# Patient Record
Sex: FEMALE | Race: WHITE | NOT HISPANIC OR LATINO | URBAN - METROPOLITAN AREA
[De-identification: names, ages, dates, MRNs, and addresses within clinical notes are randomized per-mention and may not be internally consistent; named-entity substitution may affect disease eponyms.]

---

## 2023-12-15 ENCOUNTER — LAB (OUTPATIENT)
Dept: LAB | Facility: LAB | Age: 28
End: 2023-12-15
Payer: COMMERCIAL

## 2023-12-15 DIAGNOSIS — O20.0: ICD-10-CM

## 2023-12-15 DIAGNOSIS — O20.0: Primary | ICD-10-CM

## 2023-12-15 LAB — HCG SERPL-ACNC: 1517 MIU/ML

## 2023-12-15 PROCEDURE — 84702 CHORIONIC GONADOTROPIN TEST: CPT

## 2023-12-15 PROCEDURE — 36415 COLL VENOUS BLD VENIPUNCTURE: CPT

## 2023-12-18 ENCOUNTER — LAB (OUTPATIENT)
Dept: LAB | Facility: LAB | Age: 28
End: 2023-12-18
Payer: COMMERCIAL

## 2023-12-18 DIAGNOSIS — O20.0: ICD-10-CM

## 2023-12-18 LAB — HCG SERPL-ACNC: 3527 MIU/ML

## 2023-12-18 PROCEDURE — 36415 COLL VENOUS BLD VENIPUNCTURE: CPT

## 2023-12-18 PROCEDURE — 84702 CHORIONIC GONADOTROPIN TEST: CPT

## 2023-12-28 DIAGNOSIS — O20.0 THREATENED ABORTION (HHS-HCC): Primary | ICD-10-CM

## 2024-01-05 ENCOUNTER — APPOINTMENT (OUTPATIENT)
Dept: OBSTETRICS AND GYNECOLOGY | Facility: CLINIC | Age: 29
End: 2024-01-05
Payer: COMMERCIAL

## 2024-03-26 PROCEDURE — 87800 DETECT AGNT MULT DNA DIREC: CPT

## 2024-03-26 PROCEDURE — 88175 CYTOPATH C/V AUTO FLUID REDO: CPT

## 2024-03-27 ENCOUNTER — LAB REQUISITION (OUTPATIENT)
Dept: LAB | Facility: HOSPITAL | Age: 29
End: 2024-03-27
Payer: COMMERCIAL

## 2024-03-27 DIAGNOSIS — Z34.81 ENCOUNTER FOR SUPERVISION OF OTHER NORMAL PREGNANCY, FIRST TRIMESTER (HHS-HCC): ICD-10-CM

## 2024-03-28 ENCOUNTER — LAB (OUTPATIENT)
Dept: LAB | Facility: LAB | Age: 29
End: 2024-03-28
Payer: COMMERCIAL

## 2024-03-28 DIAGNOSIS — Z87.59 PERSONAL HISTORY OF OTHER COMPLICATIONS OF PREGNANCY, CHILDBIRTH AND THE PUERPERIUM: ICD-10-CM

## 2024-03-28 DIAGNOSIS — Z34.81 ENCOUNTER FOR SUPERVISION OF OTHER NORMAL PREGNANCY, FIRST TRIMESTER (HHS-HCC): Primary | ICD-10-CM

## 2024-03-28 LAB
ABO GROUP (TYPE) IN BLOOD: NORMAL
ALT SERPL-CCNC: 9 U/L (ref 5–40)
ANTIBODY SCREEN: NORMAL
AST SERPL-CCNC: 12 U/L (ref 5–40)
BUN SERPL-MCNC: 6 MG/DL (ref 8–25)
C TRACH RRNA SPEC QL NAA+PROBE: NEGATIVE
CREAT SERPL-MCNC: 0.6 MG/DL (ref 0.4–1.6)
CREAT UR-MCNC: 236.8 MG/DL
EGFRCR SERPLBLD CKD-EPI 2021: >90 ML/MIN/1.73M*2
ERYTHROCYTE [DISTWIDTH] IN BLOOD BY AUTOMATED COUNT: 13.8 % (ref 11.5–14.5)
EST. AVERAGE GLUCOSE BLD GHB EST-MCNC: 88 MG/DL
HBA1C MFR BLD: 4.7 %
HBV SURFACE AG SERPL QL IA: NONREACTIVE
HCT VFR BLD AUTO: 39.5 % (ref 36–46)
HCV AB SER QL: NONREACTIVE
HGB BLD-MCNC: 13.2 G/DL (ref 12–16)
HIV 1+2 AB+HIV1 P24 AG SERPL QL IA: NONREACTIVE
LDH SERPL-CCNC: 154 U/L (ref 91–227)
MCH RBC QN AUTO: 29.4 PG (ref 26–34)
MCHC RBC AUTO-ENTMCNC: 33.4 G/DL (ref 32–36)
MCV RBC AUTO: 88 FL (ref 80–100)
N GONORRHOEA DNA SPEC QL PROBE+SIG AMP: NEGATIVE
NRBC BLD-RTO: 0 /100 WBCS (ref 0–0)
PLATELET # BLD AUTO: 201 X10*3/UL (ref 150–450)
PROT UR-MCNC: 23 MG/DL
PROT/CREAT UR: 0.1 MG/MG CREAT
RBC # BLD AUTO: 4.49 X10*6/UL (ref 4–5.2)
RH FACTOR (ANTIGEN D): NORMAL
RUBV IGG SERPL IA-ACNC: 1 IA
RUBV IGG SERPL QL IA: POSITIVE
TREPONEMA PALLIDUM IGG+IGM AB [PRESENCE] IN SERUM OR PLASMA BY IMMUNOASSAY: NONREACTIVE
URATE SERPL-MCNC: 5.8 MG/DL (ref 2.5–6.8)
WBC # BLD AUTO: 9.9 X10*3/UL (ref 4.4–11.3)

## 2024-03-28 PROCEDURE — 87389 HIV-1 AG W/HIV-1&-2 AB AG IA: CPT

## 2024-03-28 PROCEDURE — 83615 LACTATE (LD) (LDH) ENZYME: CPT

## 2024-03-28 PROCEDURE — 82570 ASSAY OF URINE CREATININE: CPT

## 2024-03-28 PROCEDURE — 86780 TREPONEMA PALLIDUM: CPT

## 2024-03-28 PROCEDURE — 86850 RBC ANTIBODY SCREEN: CPT

## 2024-03-28 PROCEDURE — 86317 IMMUNOASSAY INFECTIOUS AGENT: CPT

## 2024-03-28 PROCEDURE — 86901 BLOOD TYPING SEROLOGIC RH(D): CPT

## 2024-03-28 PROCEDURE — 87340 HEPATITIS B SURFACE AG IA: CPT

## 2024-03-28 PROCEDURE — 84460 ALANINE AMINO (ALT) (SGPT): CPT

## 2024-03-28 PROCEDURE — 83036 HEMOGLOBIN GLYCOSYLATED A1C: CPT

## 2024-03-28 PROCEDURE — 86803 HEPATITIS C AB TEST: CPT

## 2024-03-28 PROCEDURE — 36415 COLL VENOUS BLD VENIPUNCTURE: CPT

## 2024-03-28 PROCEDURE — 86900 BLOOD TYPING SEROLOGIC ABO: CPT

## 2024-03-28 PROCEDURE — 85027 COMPLETE CBC AUTOMATED: CPT

## 2024-03-28 PROCEDURE — 84450 TRANSFERASE (AST) (SGOT): CPT

## 2024-03-28 PROCEDURE — 84520 ASSAY OF UREA NITROGEN: CPT

## 2024-03-28 PROCEDURE — 84550 ASSAY OF BLOOD/URIC ACID: CPT

## 2024-03-28 PROCEDURE — 87086 URINE CULTURE/COLONY COUNT: CPT

## 2024-03-28 PROCEDURE — 82565 ASSAY OF CREATININE: CPT

## 2024-03-28 PROCEDURE — 84156 ASSAY OF PROTEIN URINE: CPT

## 2024-03-30 LAB — BACTERIA UR CULT: NORMAL

## 2024-04-03 LAB
CYTOLOGY CMNT CVX/VAG CYTO-IMP: NORMAL
LAB AP HPV GENOTYPE QUESTION: NO
LAB AP HPV HR: NORMAL
LAB AP PAP ADDITIONAL TESTS: NORMAL
LABORATORY COMMENT REPORT: NORMAL
LMP START DATE: NORMAL
MENSTRUAL HX REPORTED: NORMAL
PATH REPORT.TOTAL CANCER: NORMAL

## 2024-05-02 ENCOUNTER — LAB (OUTPATIENT)
Dept: LAB | Facility: LAB | Age: 29
End: 2024-05-02
Payer: COMMERCIAL

## 2024-05-02 DIAGNOSIS — Z34.82 ENCOUNTER FOR SUPERVISION OF OTHER NORMAL PREGNANCY, SECOND TRIMESTER (HHS-HCC): Primary | ICD-10-CM

## 2024-05-02 LAB
ERYTHROCYTE [DISTWIDTH] IN BLOOD BY AUTOMATED COUNT: 13.7 % (ref 11.5–14.5)
GLUCOSE 1H P GLC SERPL-MCNC: 86 MG/DL (ref 65–139)
HCT VFR BLD AUTO: 40.8 % (ref 36–46)
HGB BLD-MCNC: 13.4 G/DL (ref 12–16)
MCH RBC QN AUTO: 29.5 PG (ref 26–34)
MCHC RBC AUTO-ENTMCNC: 32.8 G/DL (ref 32–36)
MCV RBC AUTO: 90 FL (ref 80–100)
NRBC BLD-RTO: 0 /100 WBCS (ref 0–0)
PLATELET # BLD AUTO: 218 X10*3/UL (ref 150–450)
RBC # BLD AUTO: 4.54 X10*6/UL (ref 4–5.2)
WBC # BLD AUTO: 10.9 X10*3/UL (ref 4.4–11.3)

## 2024-05-02 PROCEDURE — 82947 ASSAY GLUCOSE BLOOD QUANT: CPT

## 2024-05-02 PROCEDURE — 36415 COLL VENOUS BLD VENIPUNCTURE: CPT

## 2024-05-02 PROCEDURE — 85027 COMPLETE CBC AUTOMATED: CPT

## 2024-05-15 PROBLEM — Z87.59 HISTORY OF GESTATIONAL HYPERTENSION: Status: ACTIVE | Noted: 2024-05-15

## 2024-05-15 PROBLEM — F43.10 PTSD (POST-TRAUMATIC STRESS DISORDER): Status: ACTIVE | Noted: 2024-05-15

## 2024-05-15 PROBLEM — Z87.59 HISTORY OF POSTPARTUM DEPRESSION: Status: ACTIVE | Noted: 2024-05-15

## 2024-05-15 PROBLEM — O99.210 OBESITY AFFECTING PREGNANCY (HHS-HCC): Status: ACTIVE | Noted: 2024-05-15

## 2024-05-15 PROBLEM — Z30.09 BIRTH CONTROL COUNSELING: Status: ACTIVE | Noted: 2024-05-15

## 2024-05-15 PROBLEM — Z86.59 HISTORY OF POSTPARTUM DEPRESSION: Status: ACTIVE | Noted: 2024-05-15

## 2024-05-15 PROBLEM — F31.9 BIPOLAR 1 DISORDER (MULTI): Status: ACTIVE | Noted: 2024-05-15

## 2024-05-15 NOTE — ASSESSMENT & PLAN NOTE
-Patient with prior hx of gHTN in 2 prior pregnancies, no c/f PEC w/ SF requiring Mg gtt intrapartum or postpartum  -Given prior history of gHTN, discussed with patient increased likelihood of developing gHTN in subsequent pregnancies.   -we discussed risks associated with gHTN in pregnancy including PEC, IUGR, IUFD, oligohydramnios, abruption. Patient expressed understanding of the above   -[facility] HELLP labs wnl  -  testing: would recommend growth US at 32/36 wks with twice weekly BPPS or NSTs with AFIs

## 2024-05-15 NOTE — ASSESSMENT & PLAN NOTE
-Watched her father take his own life at age 18  -Discussed any healthcare related or personal triggers at today's appointment, currently denies  -Encouraged to reach out if additional support needed during this pregnancy

## 2024-05-15 NOTE — ASSESSMENT & PLAN NOTE
-Patient not currently on medication per chart review  -Patient feeling really well today, denies/endorses SI/HI/AVH  -Continue to check in and monitor throughout pregnancy  -Declines starting medication today, encouraged to call in if any concerns.

## 2024-05-15 NOTE — ASSESSMENT & PLAN NOTE
-we discussed that obesity during pregnancy has both maternal and fetal sequelae. Obese women with gestational diabetes have a two-fold increased prevalence of developing Type 2 diabetes later in life compared to patients without gestational diabetes and a normal BMI.  In addition, there is an increased risk of developing gestational hypertension, a 6% chance of preeclampsia, increased risk of an operative vaginal delivery, and an increased risk of shoulder dystocia and  section.  In the post-partum period, obesity is associated with both wound dehiscence and wound infection should the patient need a  delivery but has a history of prior uncomplicated term vaginal deliveries.    -Fetal complications related to maternal obesity include a 15% chance of macrosomia, an increased risk for fetal anomalies, most commonly neural tube and cardiovascular defects, and an increase risk of stillbirth.  Due to maternal habitus it is also less likely that a congenital anomaly will be visualized prenatally by ultrasound.    -As Ms. Valenzuela's pre-pregnancy BMI was 43 kg/m2, the [place] [employer] recommends no more than an 11-20 pound weight gain during pregnancy however patient has gained >70lb. Would encourage to maintain steady weight at this time  -Nutrition consult, weekly NST startingat 34 weeks, and growth at 30 and 36 weeks though will bring her back in 3 weeks due to borderline biometry today. Will plan to call patient outpatient  -At this time, patient will plan to delivery at [facility] due to BMI and increased risks of complication if delivery necessitating  section. Given higher level care center, this would be appropriate. However, can continue routine PNC with general OB provider who will schedule patient for delivery at [facility] as indicated.  -Labs reviewed, including A1C earlier in pregnancy without any acute concern for T2DM

## 2024-05-16 ENCOUNTER — INITIAL PRENATAL (OUTPATIENT)
Dept: MATERNAL FETAL MEDICINE | Facility: CLINIC | Age: 29
End: 2024-05-16
Payer: COMMERCIAL

## 2024-05-16 ENCOUNTER — HOSPITAL ENCOUNTER (OUTPATIENT)
Dept: RADIOLOGY | Facility: CLINIC | Age: 29
Discharge: HOME | End: 2024-05-16
Payer: COMMERCIAL

## 2024-05-16 VITALS
WEIGHT: 293 LBS | SYSTOLIC BLOOD PRESSURE: 130 MMHG | DIASTOLIC BLOOD PRESSURE: 78 MMHG | HEIGHT: 63 IN | BODY MASS INDEX: 51.91 KG/M2

## 2024-05-16 DIAGNOSIS — Z30.09 BIRTH CONTROL COUNSELING: ICD-10-CM

## 2024-05-16 DIAGNOSIS — E66.01 SEVERE OBESITY DUE TO EXCESS CALORIES AFFECTING PREGNANCY IN SECOND TRIMESTER (MULTI): Primary | ICD-10-CM

## 2024-05-16 DIAGNOSIS — F43.10 PTSD (POST-TRAUMATIC STRESS DISORDER): ICD-10-CM

## 2024-05-16 DIAGNOSIS — O99.210 OBESITY AFFECTING PREGNANCY (HHS-HCC): ICD-10-CM

## 2024-05-16 DIAGNOSIS — Z87.59 HISTORY OF GESTATIONAL HYPERTENSION: ICD-10-CM

## 2024-05-16 DIAGNOSIS — Z3A.27 27 WEEKS GESTATION OF PREGNANCY (HHS-HCC): ICD-10-CM

## 2024-05-16 DIAGNOSIS — Z87.59 HISTORY OF POSTPARTUM DEPRESSION: ICD-10-CM

## 2024-05-16 DIAGNOSIS — Z86.59 HISTORY OF POSTPARTUM DEPRESSION: ICD-10-CM

## 2024-05-16 DIAGNOSIS — Z36.89 SCREENING, ANTENATAL, FOR FETAL ANATOMIC SURVEY (HHS-HCC): ICD-10-CM

## 2024-05-16 DIAGNOSIS — O99.212 SEVERE OBESITY DUE TO EXCESS CALORIES AFFECTING PREGNANCY IN SECOND TRIMESTER (MULTI): Primary | ICD-10-CM

## 2024-05-16 PROBLEM — O99.342 BIPOLAR DISEASE DURING PREGNANCY IN SECOND TRIMESTER (MULTI): Status: ACTIVE | Noted: 2024-05-15

## 2024-05-16 PROCEDURE — 76811 OB US DETAILED SNGL FETUS: CPT

## 2024-05-16 PROCEDURE — 99214 OFFICE O/P EST MOD 30 MIN: CPT | Mod: GC,25 | Performed by: OBSTETRICS & GYNECOLOGY

## 2024-05-16 PROCEDURE — 76819 FETAL BIOPHYS PROFIL W/O NST: CPT | Performed by: OBSTETRICS & GYNECOLOGY

## 2024-05-16 PROCEDURE — 76819 FETAL BIOPHYS PROFIL W/O NST: CPT

## 2024-05-16 PROCEDURE — 99204 OFFICE O/P NEW MOD 45 MIN: CPT | Performed by: OBSTETRICS & GYNECOLOGY

## 2024-05-16 PROCEDURE — 76811 OB US DETAILED SNGL FETUS: CPT | Performed by: OBSTETRICS & GYNECOLOGY

## 2024-05-16 NOTE — PROGRESS NOTES
2024   Elma Valenzuela     MFM CONSULT NOTE  Referring Clinician: Dr. Worrell  Reason for consult: Obesity in Pregnancy, History of gHTN in prior pregnancy    HPI: Elma Valenzuela is a 29 y.o.  at 27w4d here for consult for Obesity in Pregnancy and history of gestational HTN. Doing okay at today's visit. Denies LOF, VB and reporting good FM. Has otherwise had a pretty uncomplicated pregnancy at this point and denies any concerns. Regarding her weight gain in pregnancy, patient states that she had previously lost weight in pregnancy but states that she has had an increased weight trend this pregnancy of >70lbs. Denies any dietary changes and notes an otherwise sedentary lifestyle.     Patient reports.     Other pregnancy complications include   Patient Active Problem List   Diagnosis    Obesity affecting pregnancy ([facility])    History of gestational hypertension    PTSD (post-traumatic stress disorder)    Bipolar disease during pregnancy in second trimester (Multi)    History of postpartum depression    Birth control counseling    27 weeks gestation of pregnancy ([facility])   Denies contractions, bleeding, or LOF. Reports normal fetal movement    10 point review of system is negative except as above    OB History          5    Para   4    Term   3       1    AB   0    Living   4         SAB        IAB        Ectopic        Multiple        Live Births   3                   Past medical history: Denies HTN, DM, asthma, depression, or thyroid issues    Past Surgical History:   Procedure Laterality Date    OTHER SURGICAL HISTORY  2014    Dental Surgery       Medications:   Medication Documentation Review Audit       Reviewed by Michele Li MD (Resident) on 24 at 1210      Medication Order Taking? Sig Documenting Provider Last Dose Status   BABY ASPIRIN ORAL [id number] Yes Take by mouth. Historical Provider, MD  Active   prenatal no115/iron/folic acid (PRENATAL 19 ORAL) [id number]  "Yes Take by mouth. Historical Provider, MD  Active                     Allergies   Allergen Reactions    Amoxicillin Shortness of breath    Shrimp Anaphylaxis       Social History     Tobacco Use    Smoking status: Never    Smokeless tobacco: Never   Substance Use Topics    Alcohol use: Not Currently    Drug use: Never       family history includes Bipolar disorder in her mother; Breast cancer in her paternal grandmother; Colon cancer in her paternal grandfather; Depression in her sister; Hypertension in her father; Uterine cancer in an other family member.      OBJECTIVE  Visit Vitals  /78 Comment: HR 75   Ht 1.6 m (5' 3\")   Wt 140 kg (308 lb 9.6 oz)   BMI 54.67 kg/m²   OB Status Pregnant   Smoking Status Never   BSA 2.49 m²       Physical exam    FHT: US    ASSESSMENT & PLAN    Elma Valenzuela is a 29 y.o.  at 27w4d here for the following:    Obesity affecting pregnancy ([facility])  -we discussed that obesity during pregnancy has both maternal and fetal sequelae. Obese women with gestational diabetes have a two-fold increased prevalence of developing Type 2 diabetes later in life compared to patients without gestational diabetes and a normal BMI.  In addition, there is an increased risk of developing gestational hypertension, a 6% chance of preeclampsia, increased risk of an operative vaginal delivery, and an increased risk of shoulder dystocia and  section.  In the post-partum period, obesity is associated with both wound dehiscence and wound infection should the patient need a  delivery but has a history of prior uncomplicated term vaginal deliveries.    -Fetal complications related to maternal obesity include a 15% chance of macrosomia, an increased risk for fetal anomalies, most commonly neural tube and cardiovascular defects, and an increase risk of stillbirth.  Due to maternal habitus it is also less likely that a congenital anomaly will be visualized prenatally by ultrasound.  "   -As Ms. Valenzuela's current BMI is 43 kg/m2, the [place] of [employer] recommends no more than an 11-20 pound weight gain during pregnancy. Would encourage to maintain steady weight at this time  -Nutrition consult, twice weekly NST, and q3week US. Will plan to call patient outpatient  -At this time, patient will plan to delivery at [facility] due to BMI and increased risks of complication if delivery necessitating  section. Given higher level care center, this would be appropriate. However, can continue routine PNC with general OB provider who will schedule patient for delivery at [facility] as indicated.  -Labs reviewed, including A1C earlier in pregnancy without any acute concern for T2DM  -1h GTT as indicated         History of gestational hypertension  -Patient with prior hx of gHTN in 2 prior pregnancies, no c/f PEC w/ SF requiring Mg gtt intrapartum or postpartum  -Given prior history of gHTN, discussed with patient increased likelihood of developing gHTN in subsequent pregnancies.   -we discussed risks associated with gHTN in pregnancy including PEC, IUGR, IUFD, oligohydramnios, abruption. Patient expressed understanding of the above   -[facility] HELLP labs wnl  -  testing: would recommend growth US at 32/36 wks with twice weekly BPPS or NSTs with AFIs       PTSD (post-traumatic stress disorder)  -Watched her father take his own life at age 18  -Discussed any healthcare related or personal triggers at today's appointment, currently denies  -Encouraged to reach out if additional support needed during this pregnancy    History of postpartum depression  -Patient not currently on medication per chart review  -Patient feeling really well today, denies/endorses SI/HI/AVH  -Continue to check in and monitor throughout pregnancy  -Declines starting medication today, encouraged to call in if any concerns.    Birth control counseling  -Patient desire BTL, consents signed in office today  5/16    27 weeks gestation of pregnancy ([facility])  -PNLs reviewed, UTD  -Continue routine PNC w/ primary OB provider Dr. Worrell at [facility]     Thank you for allowing us to participate in the care of your patient. We anticipate she should be able to continue her care under your supervision. Please do not hesitate to contact us should any concerns arise.    D/w Dr. Salgado--M Attending    Michele Li MD   Maternal Fetal Medicine

## 2024-05-16 NOTE — ASSESSMENT & PLAN NOTE
-PNLs reviewed, UTD  -Continue routine PNC w/ primary OB provider Dr. Worrell at [facility]

## 2024-05-31 ENCOUNTER — LAB (OUTPATIENT)
Dept: LAB | Facility: LAB | Age: 29
End: 2024-05-31
Payer: COMMERCIAL

## 2024-05-31 DIAGNOSIS — R03.0 ELEVATED BLOOD-PRESSURE READING, WITHOUT DIAGNOSIS OF HYPERTENSION: Primary | ICD-10-CM

## 2024-05-31 LAB
ALT SERPL-CCNC: 12 U/L (ref 5–40)
AST SERPL-CCNC: 12 U/L (ref 5–40)
BUN SERPL-MCNC: 6 MG/DL (ref 8–25)
CREAT SERPL-MCNC: 0.6 MG/DL (ref 0.4–1.6)
CREAT UR-MCNC: 192.1 MG/DL
EGFRCR SERPLBLD CKD-EPI 2021: >90 ML/MIN/1.73M*2
ERYTHROCYTE [DISTWIDTH] IN BLOOD BY AUTOMATED COUNT: 13.8 % (ref 11.5–14.5)
HCT VFR BLD AUTO: 39.7 % (ref 36–46)
HGB BLD-MCNC: 13 G/DL (ref 12–16)
MCH RBC QN AUTO: 28.7 PG (ref 26–34)
MCHC RBC AUTO-ENTMCNC: 32.7 G/DL (ref 32–36)
MCV RBC AUTO: 88 FL (ref 80–100)
NRBC BLD-RTO: 0 /100 WBCS (ref 0–0)
PLATELET # BLD AUTO: 213 X10*3/UL (ref 150–450)
PROT UR-MCNC: 18 MG/DL
PROT/CREAT UR: 0.09 MG/MG CREAT
RBC # BLD AUTO: 4.53 X10*6/UL (ref 4–5.2)
URATE SERPL-MCNC: 5.3 MG/DL (ref 2.5–6.8)
WBC # BLD AUTO: 12 X10*3/UL (ref 4.4–11.3)

## 2024-05-31 PROCEDURE — 84460 ALANINE AMINO (ALT) (SGPT): CPT

## 2024-05-31 PROCEDURE — 82565 ASSAY OF CREATININE: CPT

## 2024-05-31 PROCEDURE — 36415 COLL VENOUS BLD VENIPUNCTURE: CPT

## 2024-05-31 PROCEDURE — 85027 COMPLETE CBC AUTOMATED: CPT

## 2024-05-31 PROCEDURE — 84156 ASSAY OF PROTEIN URINE: CPT

## 2024-05-31 PROCEDURE — 84520 ASSAY OF UREA NITROGEN: CPT

## 2024-05-31 PROCEDURE — 84550 ASSAY OF BLOOD/URIC ACID: CPT

## 2024-05-31 PROCEDURE — 82570 ASSAY OF URINE CREATININE: CPT

## 2024-05-31 PROCEDURE — 84450 TRANSFERASE (AST) (SGOT): CPT

## 2024-06-06 ENCOUNTER — HOSPITAL ENCOUNTER (OUTPATIENT)
Dept: RADIOLOGY | Facility: CLINIC | Age: 29
Discharge: HOME | End: 2024-06-06
Payer: COMMERCIAL

## 2024-06-06 DIAGNOSIS — Z36.89 SCREENING, ANTENATAL, FOR FETAL ANATOMIC SURVEY (HHS-HCC): ICD-10-CM

## 2024-06-06 PROCEDURE — 76816 OB US FOLLOW-UP PER FETUS: CPT | Performed by: OBSTETRICS & GYNECOLOGY

## 2024-06-06 PROCEDURE — 76816 OB US FOLLOW-UP PER FETUS: CPT

## 2024-06-10 ENCOUNTER — HOSPITAL ENCOUNTER (EMERGENCY)
Facility: HOSPITAL | Age: 29
Discharge: HOME | End: 2024-06-11
Attending: EMERGENCY MEDICINE
Payer: COMMERCIAL

## 2024-06-10 DIAGNOSIS — K04.7 DENTAL ABSCESS: Primary | ICD-10-CM

## 2024-06-10 DIAGNOSIS — R03.0 ELEVATED BLOOD PRESSURE READING: ICD-10-CM

## 2024-06-10 PROCEDURE — 99283 EMERGENCY DEPT VISIT LOW MDM: CPT

## 2024-06-10 PROCEDURE — 2500000001 HC RX 250 WO HCPCS SELF ADMINISTERED DRUGS (ALT 637 FOR MEDICARE OP): Performed by: EMERGENCY MEDICINE

## 2024-06-10 RX ADMIN — TOPICAL ANESTHETIC 1 SPRAY: 200 SPRAY DENTAL; PERIODONTAL at 23:25

## 2024-06-10 ASSESSMENT — COLUMBIA-SUICIDE SEVERITY RATING SCALE - C-SSRS
1. IN THE PAST MONTH, HAVE YOU WISHED YOU WERE DEAD OR WISHED YOU COULD GO TO SLEEP AND NOT WAKE UP?: NO
6. HAVE YOU EVER DONE ANYTHING, STARTED TO DO ANYTHING, OR PREPARED TO DO ANYTHING TO END YOUR LIFE?: NO
2. HAVE YOU ACTUALLY HAD ANY THOUGHTS OF KILLING YOURSELF?: NO

## 2024-06-10 ASSESSMENT — PAIN - FUNCTIONAL ASSESSMENT: PAIN_FUNCTIONAL_ASSESSMENT: 0-10

## 2024-06-10 ASSESSMENT — PAIN SCALES - GENERAL: PAINLEVEL_OUTOF10: 0 - NO PAIN

## 2024-06-11 ENCOUNTER — HOSPITAL ENCOUNTER (OUTPATIENT)
Facility: HOSPITAL | Age: 29
Discharge: HOME | End: 2024-06-11
Attending: OBSTETRICS & GYNECOLOGY | Admitting: OBSTETRICS & GYNECOLOGY
Payer: COMMERCIAL

## 2024-06-11 VITALS
WEIGHT: 293 LBS | BODY MASS INDEX: 51.91 KG/M2 | HEIGHT: 63 IN | SYSTOLIC BLOOD PRESSURE: 109 MMHG | TEMPERATURE: 98.2 F | RESPIRATION RATE: 18 BRPM | DIASTOLIC BLOOD PRESSURE: 65 MMHG | HEART RATE: 83 BPM | OXYGEN SATURATION: 98 %

## 2024-06-11 VITALS
DIASTOLIC BLOOD PRESSURE: 91 MMHG | WEIGHT: 293 LBS | RESPIRATION RATE: 15 BRPM | HEIGHT: 63 IN | SYSTOLIC BLOOD PRESSURE: 137 MMHG | OXYGEN SATURATION: 100 % | HEART RATE: 91 BPM | BODY MASS INDEX: 51.91 KG/M2 | TEMPERATURE: 97.9 F

## 2024-06-11 LAB
ALBUMIN SERPL-MCNC: 3.1 G/DL (ref 3.5–5)
ALP BLD-CCNC: 81 U/L (ref 35–125)
ALT SERPL-CCNC: 10 U/L (ref 5–40)
ANION GAP SERPL CALC-SCNC: 13 MMOL/L
AST SERPL-CCNC: 13 U/L (ref 5–40)
BILIRUB SERPL-MCNC: 0.2 MG/DL (ref 0.1–1.2)
BUN SERPL-MCNC: 5 MG/DL (ref 8–25)
CALCIUM SERPL-MCNC: 8.4 MG/DL (ref 8.5–10.4)
CHLORIDE SERPL-SCNC: 105 MMOL/L (ref 97–107)
CO2 SERPL-SCNC: 19 MMOL/L (ref 24–31)
CREAT SERPL-MCNC: 0.5 MG/DL (ref 0.4–1.6)
CREAT UR-MCNC: 105.5 MG/DL
EGFRCR SERPLBLD CKD-EPI 2021: >90 ML/MIN/1.73M*2
ERYTHROCYTE [DISTWIDTH] IN BLOOD BY AUTOMATED COUNT: 13.7 % (ref 11.5–14.5)
GLUCOSE SERPL-MCNC: 77 MG/DL (ref 65–99)
HCT VFR BLD AUTO: 36 % (ref 36–46)
HGB BLD-MCNC: 12.2 G/DL (ref 12–16)
LDH SERPL-CCNC: 166 U/L (ref 91–227)
MCH RBC QN AUTO: 28.8 PG (ref 26–34)
MCHC RBC AUTO-ENTMCNC: 33.9 G/DL (ref 32–36)
MCV RBC AUTO: 85 FL (ref 80–100)
NRBC BLD-RTO: 0 /100 WBCS (ref 0–0)
PLATELET # BLD AUTO: 185 X10*3/UL (ref 150–450)
POTASSIUM SERPL-SCNC: 3.4 MMOL/L (ref 3.4–5.1)
PROT SERPL-MCNC: 6.4 G/DL (ref 5.9–7.9)
PROT UR-MCNC: 11 MG/DL
PROT/CREAT UR: 0.1 MG/MG CREAT
RBC # BLD AUTO: 4.24 X10*6/UL (ref 4–5.2)
SODIUM SERPL-SCNC: 137 MMOL/L (ref 133–145)
URATE SERPL-MCNC: 5 MG/DL (ref 2.5–6.8)
WBC # BLD AUTO: 10.8 X10*3/UL (ref 4.4–11.3)

## 2024-06-11 PROCEDURE — 80053 COMPREHEN METABOLIC PANEL: CPT | Performed by: OBSTETRICS & GYNECOLOGY

## 2024-06-11 PROCEDURE — 99214 OFFICE O/P EST MOD 30 MIN: CPT

## 2024-06-11 PROCEDURE — 82570 ASSAY OF URINE CREATININE: CPT | Performed by: OBSTETRICS & GYNECOLOGY

## 2024-06-11 PROCEDURE — 85027 COMPLETE CBC AUTOMATED: CPT | Performed by: OBSTETRICS & GYNECOLOGY

## 2024-06-11 PROCEDURE — 2500000001 HC RX 250 WO HCPCS SELF ADMINISTERED DRUGS (ALT 637 FOR MEDICARE OP): Performed by: EMERGENCY MEDICINE

## 2024-06-11 PROCEDURE — 83615 LACTATE (LD) (LDH) ENZYME: CPT | Performed by: OBSTETRICS & GYNECOLOGY

## 2024-06-11 PROCEDURE — 36415 COLL VENOUS BLD VENIPUNCTURE: CPT | Performed by: OBSTETRICS & GYNECOLOGY

## 2024-06-11 PROCEDURE — 84550 ASSAY OF BLOOD/URIC ACID: CPT | Performed by: OBSTETRICS & GYNECOLOGY

## 2024-06-11 RX ORDER — ONDANSETRON HYDROCHLORIDE 2 MG/ML
4 INJECTION, SOLUTION INTRAVENOUS EVERY 6 HOURS PRN
Status: DISCONTINUED | OUTPATIENT
Start: 2024-06-11 | End: 2024-06-11 | Stop reason: HOSPADM

## 2024-06-11 RX ORDER — NIFEDIPINE 10 MG/1
10 CAPSULE ORAL ONCE AS NEEDED
Status: DISCONTINUED | OUTPATIENT
Start: 2024-06-11 | End: 2024-06-11 | Stop reason: HOSPADM

## 2024-06-11 RX ORDER — LIDOCAINE HYDROCHLORIDE 10 MG/ML
0.5 INJECTION INFILTRATION; PERINEURAL ONCE AS NEEDED
Status: DISCONTINUED | OUTPATIENT
Start: 2024-06-11 | End: 2024-06-11 | Stop reason: HOSPADM

## 2024-06-11 RX ORDER — CEPHALEXIN 500 MG/1
500 CAPSULE ORAL ONCE
Status: COMPLETED | OUTPATIENT
Start: 2024-06-11 | End: 2024-06-11

## 2024-06-11 RX ORDER — HYDRALAZINE HYDROCHLORIDE 20 MG/ML
5 INJECTION INTRAMUSCULAR; INTRAVENOUS ONCE AS NEEDED
Status: DISCONTINUED | OUTPATIENT
Start: 2024-06-11 | End: 2024-06-11 | Stop reason: HOSPADM

## 2024-06-11 RX ORDER — CEPHALEXIN 500 MG/1
500 CAPSULE ORAL 3 TIMES DAILY
Qty: 21 CAPSULE | Refills: 0 | Status: SHIPPED | OUTPATIENT
Start: 2024-06-11 | End: 2024-06-18

## 2024-06-11 RX ORDER — ONDANSETRON 4 MG/1
4 TABLET, FILM COATED ORAL EVERY 6 HOURS PRN
Status: DISCONTINUED | OUTPATIENT
Start: 2024-06-11 | End: 2024-06-11 | Stop reason: HOSPADM

## 2024-06-11 RX ORDER — LABETALOL HYDROCHLORIDE 5 MG/ML
20 INJECTION, SOLUTION INTRAVENOUS ONCE AS NEEDED
Status: DISCONTINUED | OUTPATIENT
Start: 2024-06-11 | End: 2024-06-11 | Stop reason: HOSPADM

## 2024-06-11 RX ADMIN — CEPHALEXIN 500 MG: 500 CAPSULE ORAL at 00:43

## 2024-06-11 ASSESSMENT — PAIN SCALES - GENERAL
PAINLEVEL: 5 - MODERATE PAIN
PAINLEVEL_OUTOF10: 5 - MODERATE PAIN

## 2024-06-11 NOTE — H&P
Obstetrical Admission History and Physical    28 yo  history of past GHTN, with mild range BP at last visit for the first time this pregnancy.     Pt also currently with known dental abscess that was drained in the ED last night. Started on Keflex, but she hasn't filled the prescription yet. Today has had a headache in addition to her abscess pain. Has been taking regular tylenol, but headache as been persisting.    No vision changes, no nausea/vomiting.    Monitoring home BP and she states it was 144/93 at home.       A/P 28 yo  @ 31w2d, current dental abscess, elevated BP on home monitoring    Pt normotensive here, continue to monitor, check HELLP labs    Pain could be secondary to dental abscess. Offered patient stronger pain meds while being observed in triage but she declines at this time.     Category 1 FHR     Obstetrical History   OB History    Para Term  AB Living   5 4 3 1 0 4   SAB IAB Ectopic Multiple Live Births           3      # Outcome Date GA Lbr Ascencion/2nd Weight Sex Delivery Anes PTL Lv   5 Current            4   35w0d  2.722 kg M Vag-Spont EPI  CHIOMA   3 Term  39w0d   M Vag-Spont EPI  CHIOMA   2 Term    2.835 kg M Vag-Spont EPI N    1 Term    2.892 kg F Vag-Spont EPI N CHIOMA       Past Medical History  Past Medical History:   Diagnosis Date    Anxiety disorder, unspecified     Anxiety    Bipolar 1 disorder (Multi)     Personal history of other infectious and parasitic diseases     History of varicella    Personal history of other mental and behavioral disorders     History of depression    PTSD (post-traumatic stress disorder)         Past Surgical History   Past Surgical History:   Procedure Laterality Date    OTHER SURGICAL HISTORY  2014    Dental Surgery       Social History  Social History     Tobacco Use    Smoking status: Never    Smokeless tobacco: Never   Substance Use Topics    Alcohol use: Not Currently     Substance and Sexual Activity   Drug Use Never        Allergies  Amoxicillin and Shrimp     Medications  Medications Prior to Admission   Medication Sig Dispense Refill Last Dose    BABY ASPIRIN ORAL Take by mouth.       cephalexin (Keflex) 500 mg capsule Take 1 capsule (500 mg) by mouth 3 times a day for 7 days. 21 capsule 0     prenatal no115/iron/folic acid (PRENATAL 19 ORAL) Take by mouth.          Objective    Last Vitals  Temp Pulse Resp BP MAP O2 Sat   36.8 °C (98.2 °F) 90 18 137/77   97 %     Physical Examination  GENERAL: Examination reveals a well developed, well nourished, gravid female in no acute distress. She is alert and cooperative.  ABDOMEN: soft, gravid, nontender, nondistended, no abnormal masses, no epigastric pain  FHR is 140 mod [place] , with accels , and a category 1  tracing.    [place] reading:  acontractile

## 2024-06-11 NOTE — PROGRESS NOTES
Pt feeling better  Normal labs, spot 0.1  Discharge home, follow up in 2 days as scheduled for BP check/NST.     Results for orders placed or performed during the hospital encounter of 06/11/24 (from the past 24 hour(s))   Protein, Urine Random   Result Value Ref Range    Total Protein, Urine Random 11 NOT ESTABLISHED mg/dL    Creatinine, Urine Random 105.5 NOT ESTABLISHED mg/dL    T. Protein/Creatinine Ratio 0.10 <0.20 mg/mg Creat   Comprehensive metabolic panel   Result Value Ref Range    Glucose 77 65 - 99 mg/dL    Sodium 137 133 - 145 mmol/L    Potassium 3.4 3.4 - 5.1 mmol/L    Chloride 105 97 - 107 mmol/L    Bicarbonate 19 (L) 24 - 31 mmol/L    Urea Nitrogen 5 (L) 8 - 25 mg/dL    Creatinine 0.50 0.40 - 1.60 mg/dL    eGFR >90 >60 mL/min/1.73m*2    Calcium 8.4 (L) 8.5 - 10.4 mg/dL    Albumin 3.1 (L) 3.5 - 5.0 g/dL    Alkaline Phosphatase 81 35 - 125 U/L    Total Protein 6.4 5.9 - 7.9 g/dL    AST 13 5 - 40 U/L    Bilirubin, Total 0.2 0.1 - 1.2 mg/dL    ALT 10 5 - 40 U/L    Anion Gap 13 <=19 mmol/L   Uric Acid   Result Value Ref Range    Uric Acid 5.0 2.5 - 6.8 mg/dL   CBC   Result Value Ref Range    WBC 10.8 4.4 - 11.3 x10*3/uL    nRBC 0.0 0.0 - 0.0 /100 WBCs    RBC 4.24 4.00 - 5.20 x10*6/uL    Hemoglobin 12.2 12.0 - 16.0 g/dL    Hematocrit 36.0 36.0 - 46.0 %    MCV 85 80 - 100 fL    MCH 28.8 26.0 - 34.0 pg    MCHC 33.9 32.0 - 36.0 g/dL    RDW 13.7 11.5 - 14.5 %    Platelets 185 150 - 450 x10*3/uL

## 2024-06-11 NOTE — ED PROVIDER NOTES
HPI   Chief Complaint   Patient presents with    Abscess     L sided poss. Tooth abscess. Pt complains of no pain, complains hhoweever of swollen bubble in left upper gums. Pt is aox4, stable. Pt is 7 months pregnant.       29-year-old female presented today with complaints of a developing abscess in the left gum.  The patient reports that she has previously required a root canal in the rear molar and the second molar sustained an accidental injury.  She was anticipated to have a dental procedure done however is currently pregnant and this has been delayed with an upcoming dental appointment.                          No data recorded                   Patient History   Past Medical History:   Diagnosis Date    Anxiety disorder, unspecified     Anxiety    Bipolar 1 disorder (Multi)     Personal history of other infectious and parasitic diseases     History of varicella    Personal history of other mental and behavioral disorders     History of depression    PTSD (post-traumatic stress disorder)      Past Surgical History:   Procedure Laterality Date    OTHER SURGICAL HISTORY  07/21/2014    Dental Surgery     Family History   Problem Relation Name Age of Onset    Bipolar disorder Mother      Hypertension Father      Depression Sister      Breast cancer Paternal Grandmother      Colon cancer Paternal Grandfather      Uterine cancer Other Great Aunt      Social History     Tobacco Use    Smoking status: Never    Smokeless tobacco: Never   Substance Use Topics    Alcohol use: Not Currently    Drug use: Never       Physical Exam   ED Triage Vitals [06/10/24 2228]   Temperature Heart Rate Respirations BP   36.6 °C (97.9 °F) 83 16 (!) 145/113      Pulse Ox Temp Source Heart Rate Source Patient Position   97 % Temporal Monitor Sitting      BP Location FiO2 (%)     Right arm --       Physical Exam  Vitals and nursing note reviewed.   Constitutional:       Appearance: Normal appearance.   HENT:      Head: Normocephalic and  atraumatic.      Mouth/Throat:      Mouth: Mucous membranes are moist.      Comments: Patient with noted gingival fluctuance adjacent to the left lower second molar.  There is a notable active kartik with tenderness to percussion.  There is no associated jaw swelling or facial swelling  Eyes:      Extraocular Movements: Extraocular movements intact.      Pupils: Pupils are equal, round, and reactive to light.   Cardiovascular:      Rate and Rhythm: Normal rate and regular rhythm.   Pulmonary:      Effort: Pulmonary effort is normal. No respiratory distress.      Breath sounds: No wheezing.   Skin:     General: Skin is warm and dry.      Capillary Refill: Capillary refill takes less than 2 seconds.   Neurological:      General: No focal deficit present.      Mental Status: She is alert and oriented to person, place, and time.         ED Course & MDM   Diagnoses as of 06/11/24 0018   Dental abscess   Elevated blood pressure reading       Medical Decision Making  With noted dental abscess.  This was treated with local drainage with a 20-gauge needle.  I will also start amoxicillin and she has a pending appointment with an outpatient dentist though it is unclear when they are going to schedule this.    I have also noted with the patient and her arrival blood pressure and follow-up blood pressure 137/90.  She has been very closely watched at home including twice daily blood pressures and weekly OB visits due to elevated blood pressures.  She in fact has an upcoming appointment this week for Thursday.  She will continue twice daily blood pressure checks at home.  This problem is being observed and monitored as best as can be expected and I do not think further ED workup indicated at this point based on her complaints.          Procedure  Procedures     Kaiden Jaramillo MD  06/11/24 0018

## 2024-06-13 ENCOUNTER — HOSPITAL ENCOUNTER (OUTPATIENT)
Facility: HOSPITAL | Age: 29
End: 2024-06-13
Attending: OBSTETRICS & GYNECOLOGY | Admitting: OBSTETRICS & GYNECOLOGY
Payer: COMMERCIAL

## 2024-06-13 ENCOUNTER — HOSPITAL ENCOUNTER (OUTPATIENT)
Facility: HOSPITAL | Age: 29
Discharge: HOME | End: 2024-06-13
Attending: OBSTETRICS & GYNECOLOGY | Admitting: OBSTETRICS & GYNECOLOGY
Payer: COMMERCIAL

## 2024-06-13 VITALS
DIASTOLIC BLOOD PRESSURE: 67 MMHG | HEIGHT: 63 IN | TEMPERATURE: 98.8 F | WEIGHT: 293 LBS | RESPIRATION RATE: 18 BRPM | BODY MASS INDEX: 51.91 KG/M2 | OXYGEN SATURATION: 91 % | HEART RATE: 93 BPM | SYSTOLIC BLOOD PRESSURE: 117 MMHG

## 2024-06-13 PROBLEM — O16.3 ELEVATED BLOOD PRESSURE AFFECTING PREGNANCY IN THIRD TRIMESTER, ANTEPARTUM (HHS-HCC): Status: ACTIVE | Noted: 2024-06-13

## 2024-06-13 LAB
ALBUMIN SERPL-MCNC: 3.1 G/DL (ref 3.5–5)
ALP BLD-CCNC: 77 U/L (ref 35–125)
ALT SERPL-CCNC: 14 U/L (ref 5–40)
ANION GAP SERPL CALC-SCNC: 11 MMOL/L
AST SERPL-CCNC: 17 U/L (ref 5–40)
BILIRUB SERPL-MCNC: 0.2 MG/DL (ref 0.1–1.2)
BUN SERPL-MCNC: 10 MG/DL (ref 8–25)
CALCIUM SERPL-MCNC: 8.8 MG/DL (ref 8.5–10.4)
CHLORIDE SERPL-SCNC: 104 MMOL/L (ref 97–107)
CO2 SERPL-SCNC: 21 MMOL/L (ref 24–31)
CREAT SERPL-MCNC: 0.5 MG/DL (ref 0.4–1.6)
CREAT UR-MCNC: 246.4 MG/DL
EGFRCR SERPLBLD CKD-EPI 2021: >90 ML/MIN/1.73M*2
ERYTHROCYTE [DISTWIDTH] IN BLOOD BY AUTOMATED COUNT: 13.5 % (ref 11.5–14.5)
GLUCOSE SERPL-MCNC: 78 MG/DL (ref 65–99)
HCT VFR BLD AUTO: 35.2 % (ref 36–46)
HGB BLD-MCNC: 11.9 G/DL (ref 12–16)
HOLD SPECIMEN: NORMAL
MCH RBC QN AUTO: 28.7 PG (ref 26–34)
MCHC RBC AUTO-ENTMCNC: 33.8 G/DL (ref 32–36)
MCV RBC AUTO: 85 FL (ref 80–100)
NRBC BLD-RTO: 0 /100 WBCS (ref 0–0)
PLATELET # BLD AUTO: 179 X10*3/UL (ref 150–450)
POTASSIUM SERPL-SCNC: 3.5 MMOL/L (ref 3.4–5.1)
PROT SERPL-MCNC: 6.2 G/DL (ref 5.9–7.9)
PROT UR-MCNC: 52 MG/DL
PROT/CREAT UR: 0.21 MG/MG CREAT
RBC # BLD AUTO: 4.15 X10*6/UL (ref 4–5.2)
SODIUM SERPL-SCNC: 136 MMOL/L (ref 133–145)
URATE SERPL-MCNC: 5.4 MG/DL (ref 2.5–6.8)
WBC # BLD AUTO: 11.9 X10*3/UL (ref 4.4–11.3)

## 2024-06-13 PROCEDURE — 80053 COMPREHEN METABOLIC PANEL: CPT | Performed by: OBSTETRICS & GYNECOLOGY

## 2024-06-13 PROCEDURE — 36415 COLL VENOUS BLD VENIPUNCTURE: CPT | Performed by: OBSTETRICS & GYNECOLOGY

## 2024-06-13 PROCEDURE — 85027 COMPLETE CBC AUTOMATED: CPT | Performed by: OBSTETRICS & GYNECOLOGY

## 2024-06-13 PROCEDURE — 82570 ASSAY OF URINE CREATININE: CPT | Performed by: OBSTETRICS & GYNECOLOGY

## 2024-06-13 PROCEDURE — 84550 ASSAY OF BLOOD/URIC ACID: CPT | Performed by: OBSTETRICS & GYNECOLOGY

## 2024-06-13 RX ORDER — LIDOCAINE HYDROCHLORIDE 10 MG/ML
0.5 INJECTION INFILTRATION; PERINEURAL ONCE AS NEEDED
Status: DISCONTINUED | OUTPATIENT
Start: 2024-06-13 | End: 2024-06-13 | Stop reason: HOSPADM

## 2024-06-13 SDOH — SOCIAL STABILITY: SOCIAL INSECURITY: ARE YOU OR HAVE YOU BEEN THREATENED OR ABUSED PHYSICALLY, EMOTIONALLY, OR SEXUALLY BY ANYONE?: NO

## 2024-06-13 SDOH — HEALTH STABILITY: MENTAL HEALTH: HAVE YOU USED ANY SUBSTANCES (CANABIS, COCAINE, HEROIN, HALLUCINOGENS, INHALANTS, ETC.) IN THE PAST 12 MONTHS?: NO

## 2024-06-13 SDOH — HEALTH STABILITY: MENTAL HEALTH: WISH TO BE DEAD (PAST 1 MONTH): NO

## 2024-06-13 SDOH — SOCIAL STABILITY: SOCIAL INSECURITY: HAVE YOU HAD ANY THOUGHTS OF HARMING ANYONE ELSE?: NO

## 2024-06-13 SDOH — SOCIAL STABILITY: SOCIAL INSECURITY: HAVE YOU HAD THOUGHTS OF HARMING ANYONE ELSE?: NO

## 2024-06-13 SDOH — ECONOMIC STABILITY: HOUSING INSECURITY: DO YOU FEEL UNSAFE GOING BACK TO THE PLACE WHERE YOU ARE LIVING?: NO

## 2024-06-13 SDOH — SOCIAL STABILITY: SOCIAL INSECURITY: ARE THERE ANY APPARENT SIGNS OF INJURIES/BEHAVIORS THAT COULD BE RELATED TO ABUSE/NEGLECT?: NO

## 2024-06-13 SDOH — HEALTH STABILITY: MENTAL HEALTH: HAVE YOU USED ANY PRESCRIPTION DRUGS OTHER THAN PRESCRIBED IN THE PAST 12 MONTHS?: NO

## 2024-06-13 SDOH — HEALTH STABILITY: MENTAL HEALTH: NON-SPECIFIC ACTIVE SUICIDAL THOUGHTS (PAST 1 MONTH): NO

## 2024-06-13 SDOH — SOCIAL STABILITY: SOCIAL INSECURITY: VERBAL ABUSE: DENIES

## 2024-06-13 SDOH — SOCIAL STABILITY: SOCIAL INSECURITY: DO YOU FEEL ANYONE HAS EXPLOITED OR TAKEN ADVANTAGE OF YOU FINANCIALLY OR OF YOUR PERSONAL PROPERTY?: NO

## 2024-06-13 SDOH — SOCIAL STABILITY: SOCIAL INSECURITY: DOES ANYONE TRY TO KEEP YOU FROM HAVING/CONTACTING OTHER FRIENDS OR DOING THINGS OUTSIDE YOUR HOME?: NO

## 2024-06-13 SDOH — HEALTH STABILITY: MENTAL HEALTH: SUICIDAL BEHAVIOR (LIFETIME): NO

## 2024-06-13 SDOH — SOCIAL STABILITY: SOCIAL INSECURITY: HAS ANYONE EVER THREATENED TO HURT YOUR FAMILY OR YOUR PETS?: NO

## 2024-06-13 SDOH — SOCIAL STABILITY: SOCIAL INSECURITY: ABUSE SCREEN: ADULT

## 2024-06-13 SDOH — HEALTH STABILITY: MENTAL HEALTH: STRENGTHS (MUST CHOOSE TWO): SUPPORT FROM FAMILY;STABLE HOUSING;SUPPORT FROM FRIENDS

## 2024-06-13 SDOH — SOCIAL STABILITY: SOCIAL INSECURITY: PHYSICAL ABUSE: DENIES

## 2024-06-13 SDOH — HEALTH STABILITY: MENTAL HEALTH: WERE YOU ABLE TO COMPLETE ALL THE BEHAVIORAL HEALTH SCREENINGS?: YES

## 2024-06-13 ASSESSMENT — LIFESTYLE VARIABLES
AUDIT-C TOTAL SCORE: 0
HOW OFTEN DO YOU HAVE 6 OR MORE DRINKS ON ONE OCCASION: NEVER
SKIP TO QUESTIONS 9-10: 1
AUDIT-C TOTAL SCORE: 0
HOW MANY STANDARD DRINKS CONTAINING ALCOHOL DO YOU HAVE ON A TYPICAL DAY: PATIENT DOES NOT DRINK
HOW OFTEN DO YOU HAVE A DRINK CONTAINING ALCOHOL: NEVER

## 2024-06-13 ASSESSMENT — PAIN SCALES - GENERAL
PAINLEVEL_OUTOF10: 6
PAINLEVEL: 6

## 2024-06-13 ASSESSMENT — PATIENT HEALTH QUESTIONNAIRE - PHQ9
1. LITTLE INTEREST OR PLEASURE IN DOING THINGS: NOT AT ALL
2. FEELING DOWN, DEPRESSED OR HOPELESS: NOT AT ALL
SUM OF ALL RESPONSES TO PHQ9 QUESTIONS 1 & 2: 0

## 2024-06-13 NOTE — H&P
Obstetrical Admission History and Physical     Elma Valenzuela is a 29 y.o.  at 31w4d. Estimated Date of Delivery: 24. She has had prenatal care with [facility] .    Karon Wills presents to triage with complaint of elevated BP in the office. She denies cp, sob or headache. She has had good fetal movement. She denies bleeding or leakage of fluid.        Obstetrical History   OB History    Para Term  AB Living   5 4 3 1 0 4   SAB IAB Ectopic Multiple Live Births           4      # Outcome Date GA Lbr Ascencion/2nd Weight Sex Delivery Anes PTL Lv   5 Current            4   35w0d  2.722 kg M Vag-Spont EPI  CHIOMA   3 Term  39w0d   M Vag-Spont EPI  CHIOMA   2 Term    2.835 kg M Vag-Spont EPI N    1 Term    2.892 kg F Vag-Spont EPI N CHIOMA       Past Medical History  Past Medical History:   Diagnosis Date    Anxiety disorder, unspecified     Anxiety    Bipolar 1 disorder (Multi)     Personal history of other infectious and parasitic diseases     History of varicella    Personal history of other mental and behavioral disorders     History of depression    PTSD (post-traumatic stress disorder)         Past Surgical History   Past Surgical History:   Procedure Laterality Date    OTHER SURGICAL HISTORY  2014    Dental Surgery       Social History  Social History     Tobacco Use    Smoking status: Never    Smokeless tobacco: Never   Substance Use Topics    Alcohol use: Not Currently     Substance and Sexual Activity   Drug Use Never       Allergies  Amoxicillin and Shrimp     Medications  Medications Prior to Admission   Medication Sig Dispense Refill Last Dose    BABY ASPIRIN ORAL Take by mouth.       cephalexin (Keflex) 500 mg capsule Take 1 capsule (500 mg) by mouth 3 times a day for 7 days. 21 capsule 0     prenatal no115/iron/folic acid (PRENATAL 19 ORAL) Take by mouth.          Last Vitals  Temp Pulse Resp BP MAP O2 Sat   37.1 °C (98.8 °F) 93 18 117/67   (!) 91 %     Physical  Examination  GENERAL: Examination reveals a well developed, well nourished, gravid female in no acute distress. She is alert and cooperative.  ABDOMEN: soft, gravid, nontender, nondistended, no abnormal masses, no epigastric pain, obese  FHR is reactive  [place] reading: none  The fetus is in a vertex presentation, determined by ultrasound  CERVIX: deferred  EXTREMITIES: no redness or tenderness in the calves or thighs, no edema    Lab Review  Lab Results   Component Value Date    WBC 11.9 (H) 06/13/2024    HGB 11.9 (L) 06/13/2024    HCT 35.2 (L) 06/13/2024     06/13/2024     Lab Results   Component Value Date    GLUCOSE 78 06/13/2024     06/13/2024    K 3.5 06/13/2024     06/13/2024    CO2 21 (L) 06/13/2024    ANIONGAP 11 06/13/2024    BUN 10 06/13/2024    CREATININE 0.50 06/13/2024    EGFR >90 06/13/2024    CALCIUM 8.8 06/13/2024    ALBUMIN 3.1 (L) 06/13/2024    PROT 6.2 06/13/2024    ALKPHOS 77 06/13/2024    ALT 14 06/13/2024    AST 17 06/13/2024    BILITOT 0.2 06/13/2024     0   Lab Value Date/Time    UTPCR 0.21 (H) 06/13/2024 1718    UTPCR 0.10 06/11/2024 1621    UTPCR 0.09 05/31/2024 0929    UTPCR 0.10 03/28/2024 0824       Assessment/Plan    Active Problems:    Elevated blood pressure affecting pregnancy in third trimester, antepartum ([facility])    Labs reviewed and within normal limits. BP within normal limits. Given precautions. She has follow up in office.

## 2024-06-20 ENCOUNTER — LAB (OUTPATIENT)
Dept: LAB | Facility: LAB | Age: 29
End: 2024-06-20
Payer: COMMERCIAL

## 2024-06-20 DIAGNOSIS — Z87.59 PERSONAL HISTORY OF OTHER COMPLICATIONS OF PREGNANCY, CHILDBIRTH AND THE PUERPERIUM: ICD-10-CM

## 2024-06-20 DIAGNOSIS — O28.8 OTHER ABNORMAL FINDINGS ON ANTENATAL SCREENING OF MOTHER: Primary | ICD-10-CM

## 2024-06-20 LAB
ALT SERPL-CCNC: 12 U/L (ref 5–40)
AST SERPL-CCNC: 12 U/L (ref 5–40)
BUN SERPL-MCNC: 6 MG/DL (ref 8–25)
CREAT SERPL-MCNC: 0.6 MG/DL (ref 0.4–1.6)
CREAT UR-MCNC: 227.8 MG/DL
EGFRCR SERPLBLD CKD-EPI 2021: >90 ML/MIN/1.73M*2
ERYTHROCYTE [DISTWIDTH] IN BLOOD BY AUTOMATED COUNT: 13.8 % (ref 11.5–14.5)
HCT VFR BLD AUTO: 38.9 % (ref 36–46)
HGB BLD-MCNC: 12.7 G/DL (ref 12–16)
MCH RBC QN AUTO: 28.7 PG (ref 26–34)
MCHC RBC AUTO-ENTMCNC: 32.6 G/DL (ref 32–36)
MCV RBC AUTO: 88 FL (ref 80–100)
NRBC BLD-RTO: 0 /100 WBCS (ref 0–0)
PLATELET # BLD AUTO: 218 X10*3/UL (ref 150–450)
PROT UR-MCNC: 52 MG/DL
PROT/CREAT UR: 0.23 MG/MG CREAT
RBC # BLD AUTO: 4.42 X10*6/UL (ref 4–5.2)
URATE SERPL-MCNC: 5.6 MG/DL (ref 2.5–6.8)
WBC # BLD AUTO: 12 X10*3/UL (ref 4.4–11.3)

## 2024-06-20 PROCEDURE — 84450 TRANSFERASE (AST) (SGOT): CPT

## 2024-06-20 PROCEDURE — 82565 ASSAY OF CREATININE: CPT

## 2024-06-20 PROCEDURE — 84460 ALANINE AMINO (ALT) (SGPT): CPT

## 2024-06-20 PROCEDURE — 84156 ASSAY OF PROTEIN URINE: CPT

## 2024-06-20 PROCEDURE — 82570 ASSAY OF URINE CREATININE: CPT

## 2024-06-20 PROCEDURE — 84550 ASSAY OF BLOOD/URIC ACID: CPT

## 2024-06-20 PROCEDURE — 84520 ASSAY OF UREA NITROGEN: CPT

## 2024-06-20 PROCEDURE — 85027 COMPLETE CBC AUTOMATED: CPT

## 2024-06-20 PROCEDURE — 36415 COLL VENOUS BLD VENIPUNCTURE: CPT

## 2024-07-01 ENCOUNTER — HOSPITAL ENCOUNTER (OUTPATIENT)
Facility: HOSPITAL | Age: 29
Discharge: HOME | End: 2024-07-01
Attending: STUDENT IN AN ORGANIZED HEALTH CARE EDUCATION/TRAINING PROGRAM | Admitting: STUDENT IN AN ORGANIZED HEALTH CARE EDUCATION/TRAINING PROGRAM
Payer: COMMERCIAL

## 2024-07-01 VITALS
TEMPERATURE: 98.1 F | HEART RATE: 76 BPM | DIASTOLIC BLOOD PRESSURE: 58 MMHG | WEIGHT: 293 LBS | BODY MASS INDEX: 51.91 KG/M2 | RESPIRATION RATE: 18 BRPM | OXYGEN SATURATION: 99 % | HEIGHT: 63 IN | SYSTOLIC BLOOD PRESSURE: 125 MMHG

## 2024-07-01 PROCEDURE — 59050 FETAL MONITOR W/REPORT: CPT

## 2024-07-01 RX ORDER — HYDRALAZINE HYDROCHLORIDE 20 MG/ML
5 INJECTION INTRAMUSCULAR; INTRAVENOUS ONCE AS NEEDED
Status: DISCONTINUED | OUTPATIENT
Start: 2024-07-01 | End: 2024-07-01 | Stop reason: HOSPADM

## 2024-07-01 RX ORDER — NIFEDIPINE 10 MG/1
10 CAPSULE ORAL ONCE AS NEEDED
Status: DISCONTINUED | OUTPATIENT
Start: 2024-07-01 | End: 2024-07-01 | Stop reason: HOSPADM

## 2024-07-01 RX ORDER — ONDANSETRON 4 MG/1
4 TABLET, FILM COATED ORAL EVERY 6 HOURS PRN
Status: DISCONTINUED | OUTPATIENT
Start: 2024-07-01 | End: 2024-07-01 | Stop reason: HOSPADM

## 2024-07-01 RX ORDER — ONDANSETRON HYDROCHLORIDE 2 MG/ML
4 INJECTION, SOLUTION INTRAVENOUS EVERY 6 HOURS PRN
Status: DISCONTINUED | OUTPATIENT
Start: 2024-07-01 | End: 2024-07-01 | Stop reason: HOSPADM

## 2024-07-01 RX ORDER — LABETALOL HYDROCHLORIDE 5 MG/ML
20 INJECTION, SOLUTION INTRAVENOUS ONCE AS NEEDED
Status: DISCONTINUED | OUTPATIENT
Start: 2024-07-01 | End: 2024-07-01 | Stop reason: HOSPADM

## 2024-07-01 ASSESSMENT — PAIN SCALES - GENERAL: PAINLEVEL_OUTOF10: 5 - MODERATE PAIN

## 2024-07-01 NOTE — H&P
Obstetrical Admission History and Physical     Elma Valenzuela is a 29 y.o. . Here for triage    Chief Complaint: Decreased Fetal Movement and Contractions    Assessment/Plan     at 34.1 wk here with pressure increase several days this weekend. Has had diarrhea and pressure, unsure if these are contractions, maybe every 10-15 min, but sporadic. Unsure how well the baby has been moving this evening, and so presented for evaluation. Hx cHTN, baseline bp systolic in 140s per pt. Denies HA, CP, SOB, NV, RUQ pain. Cervix was 1 cm in the office last Thursday. On exam unchanged today. No signs of labor, no contractions on monitor or palpable.   -Discharge home, labor precautions reviewed  -HTN precautions reviewed  -Has visit this week in office    Active Problems:  There are no active Hospital Problems.      Pregnancy Problems (from 24 to present)       Problem Noted Resolved    Elevated blood pressure affecting pregnancy in third trimester[facility] by Gilda Baca, DO No    Priority:  Medium      27 weeks gestation of [facility]) 2024 by Tarik Salgado MD No    Priority:  Medium      Overview Signed 2024 12:29 PM by Tarik Salgado MD     Care at [facility] with Dr. Worrell         Ob[facility] affecting [facility]) 5/15/2024 by Michele Li MD No    Priority:  Medium      Overview Signed 2024  4:10 PM by Tarik Salgado MD     Delivery at [facility] if BMI >50 at time of delivery         History of gestational hypertension 5/15/2024 by Michele Li MD No    Priority:  Medium      Overview Addendum 2024  3:55 PM by Michele Li MD     -gHTN in 2 prior pregnancies, no confirmed PEC or sPEC dx  -Never had a magnesium requirement         PTSD (post-traumatic stress disorder) 5/15/2024 by Michele Li MD No    Priority:  Medium      Overview Signed 5/15/2024  5:05 PM by Michele Li MD     Watched her father take his own life  at age 18         Bipolar disease during pregnancy in second trimester (Multi) 5/15/2024 by Michele Li MD No    Priority:  Medium      History of postpartum depression 5/15/2024 by Michele Li MD No    Priority:  Medium      Overview Addendum 5/15/2024  5:17 PM by Michele Li MD     Not currently on medication,  Patient also with history of depression outside of pregnancy               Subjective   Elma is here complaining of q10-15 min contractions and pressure. Decreased fetal movement. Denies vaginal bleeding., C/O of occasional contractions., Denies leaking of fluid.           Obstetrical History   OB History    Para Term  AB Living   5 4 3 1 0 4   SAB IAB Ectopic Multiple Live Births           4      # Outcome Date GA Lbr Ascencion/2nd Weight Sex Delivery Anes PTL Lv   5 Current            4   35w0d  2.722 kg M Vag-Spont EPI  CHIOMA   3 Term  39w0d   M Vag-Spont EPI  CHIOMA   2 Term    2.835 kg M Vag-Spont EPI N    1 Term    2.892 kg F Vag-Spont EPI N CHIOMA       Past Medical History  Past Medical History:   Diagnosis Date    Anxiety disorder, unspecified     Anxiety    Bipolar 1 disorder (Multi)     Personal history of other infectious and parasitic diseases     History of varicella    Personal history of other mental and behavioral disorders     History of depression    PTSD (post-traumatic stress disorder)         Past Surgical History   Past Surgical History:   Procedure Laterality Date    OTHER SURGICAL HISTORY  2014    Dental Surgery       Social History  Social History     Tobacco Use    Smoking status: Never    Smokeless tobacco: Never   Substance Use Topics    Alcohol use: Not Currently     Substance and Sexual Activity   Drug Use Never       Allergies  Amoxicillin and Shrimp     Medications  Medications Prior to Admission   Medication Sig Dispense Refill Last Dose    BABY ASPIRIN ORAL Take by mouth.   2024    prenatal no115/iron/folic acid (PRENATAL 19 ORAL)  "Take by mouth.   6/30/2024       Objective    Last Vitals  Temp Pulse Resp BP MAP O2 Sat     87 18 (!) 147/83   98 %     Physical Examination  GENERAL: Examination reveals a well developed, well nourished, gravid female in no acute distress. She is alert and cooperative.  LUNGS: clear to auscultation bilaterally  HEART: regular rate and rhythm, S1, S2 normal, no murmur, click, rub or gallop  FHR is 140  , with accelerations , and a reactive  tracing.    [place] reading:  none  CERVIX: 1 cm dilated, 40 % effaced, -2 station; MEMBRANES are  intact  EXTREMITIES: no redness or tenderness in the calves or thighs, no edema    Lab Review  No results found for: \"WBC\", \"HGB\", \"HCT\", \"PLT\"    "

## 2024-07-01 NOTE — NURSING NOTE
Patient discharged at this time. Reviewed labor precautions with the patient. Reviewed when to return to L&D for evaluation. Patient verbalized understanding

## 2024-07-08 ENCOUNTER — LAB (OUTPATIENT)
Dept: LAB | Facility: LAB | Age: 29
End: 2024-07-08
Payer: COMMERCIAL

## 2024-07-08 DIAGNOSIS — O16.3 UNSPECIFIED MATERNAL HYPERTENSION, THIRD TRIMESTER (HHS-HCC): Primary | ICD-10-CM

## 2024-07-08 LAB
ALT SERPL-CCNC: 10 U/L (ref 5–40)
AST SERPL-CCNC: 12 U/L (ref 5–40)
BUN SERPL-MCNC: 6 MG/DL (ref 8–25)
CREAT SERPL-MCNC: 0.6 MG/DL (ref 0.4–1.6)
CREAT UR-MCNC: 232.1 MG/DL
EGFRCR SERPLBLD CKD-EPI 2021: >90 ML/MIN/1.73M*2
ERYTHROCYTE [DISTWIDTH] IN BLOOD BY AUTOMATED COUNT: 13.5 % (ref 11.5–14.5)
HCT VFR BLD AUTO: 38.1 % (ref 36–46)
HGB BLD-MCNC: 12.6 G/DL (ref 12–16)
LDH SERPL-CCNC: 169 U/L (ref 91–227)
MCH RBC QN AUTO: 28.8 PG (ref 26–34)
MCHC RBC AUTO-ENTMCNC: 33.1 G/DL (ref 32–36)
MCV RBC AUTO: 87 FL (ref 80–100)
NRBC BLD-RTO: 0 /100 WBCS (ref 0–0)
PLATELET # BLD AUTO: 199 X10*3/UL (ref 150–450)
PROT UR-MCNC: 23 MG/DL
PROT/CREAT UR: 0.1 MG/MG CREAT
RBC # BLD AUTO: 4.38 X10*6/UL (ref 4–5.2)
URATE SERPL-MCNC: 5.4 MG/DL (ref 2.5–6.8)
WBC # BLD AUTO: 12.9 X10*3/UL (ref 4.4–11.3)

## 2024-07-08 PROCEDURE — 84520 ASSAY OF UREA NITROGEN: CPT

## 2024-07-08 PROCEDURE — 36415 COLL VENOUS BLD VENIPUNCTURE: CPT

## 2024-07-08 PROCEDURE — 84460 ALANINE AMINO (ALT) (SGPT): CPT

## 2024-07-08 PROCEDURE — 85027 COMPLETE CBC AUTOMATED: CPT

## 2024-07-08 PROCEDURE — 83615 LACTATE (LD) (LDH) ENZYME: CPT

## 2024-07-08 PROCEDURE — 82565 ASSAY OF CREATININE: CPT

## 2024-07-08 PROCEDURE — 84156 ASSAY OF PROTEIN URINE: CPT

## 2024-07-08 PROCEDURE — 82570 ASSAY OF URINE CREATININE: CPT

## 2024-07-08 PROCEDURE — 84450 TRANSFERASE (AST) (SGOT): CPT

## 2024-07-08 PROCEDURE — 84550 ASSAY OF BLOOD/URIC ACID: CPT

## 2024-07-18 ENCOUNTER — HOSPITAL ENCOUNTER (OUTPATIENT)
Dept: RADIOLOGY | Facility: CLINIC | Age: 29
Discharge: HOME | End: 2024-07-18
Payer: COMMERCIAL

## 2024-07-18 DIAGNOSIS — O36.5930 MATERNAL CARE FOR OTHER KNOWN OR SUSPECTED POOR FETAL GROWTH, THIRD TRIMESTER, NOT APPLICABLE OR UNSPECIFIED (HHS-HCC): ICD-10-CM

## 2024-07-18 DIAGNOSIS — O13.9 GESTATIONAL (PREGNANCY-INDUCED) HYPERTENSION WITHOUT SIGNIFICANT PROTEINURIA, UNSPECIFIED TRIMESTER (HHS-HCC): ICD-10-CM

## 2024-07-18 DIAGNOSIS — Z36.89 SCREENING, ANTENATAL, FOR FETAL ANATOMIC SURVEY (HHS-HCC): ICD-10-CM

## 2024-07-18 DIAGNOSIS — O99.213 OBESITY COMPLICATING PREGNANCY, THIRD TRIMESTER (HHS-HCC): ICD-10-CM

## 2024-07-18 PROCEDURE — 76816 OB US FOLLOW-UP PER FETUS: CPT | Performed by: MEDICAL GENETICS

## 2024-07-18 PROCEDURE — 76816 OB US FOLLOW-UP PER FETUS: CPT

## 2024-07-18 PROCEDURE — 76819 FETAL BIOPHYS PROFIL W/O NST: CPT

## 2024-07-18 PROCEDURE — 76819 FETAL BIOPHYS PROFIL W/O NST: CPT | Performed by: MEDICAL GENETICS

## 2024-07-19 ENCOUNTER — APPOINTMENT (OUTPATIENT)
Dept: OBSTETRICS AND GYNECOLOGY | Facility: CLINIC | Age: 29
End: 2024-07-19
Payer: COMMERCIAL

## 2024-07-22 ENCOUNTER — ANESTHESIA (OUTPATIENT)
Dept: OBSTETRICS AND GYNECOLOGY | Facility: HOSPITAL | Age: 29
End: 2024-07-22
Payer: COMMERCIAL

## 2024-07-22 ENCOUNTER — ANESTHESIA EVENT (OUTPATIENT)
Dept: OBSTETRICS AND GYNECOLOGY | Facility: HOSPITAL | Age: 29
End: 2024-07-22
Payer: COMMERCIAL

## 2024-07-22 ENCOUNTER — HOSPITAL ENCOUNTER (INPATIENT)
Facility: HOSPITAL | Age: 29
LOS: 3 days | Discharge: HOME | End: 2024-07-25
Attending: SPECIALIST | Admitting: SPECIALIST
Payer: COMMERCIAL

## 2024-07-22 ENCOUNTER — APPOINTMENT (OUTPATIENT)
Dept: OBSTETRICS AND GYNECOLOGY | Facility: HOSPITAL | Age: 29
End: 2024-07-22
Payer: COMMERCIAL

## 2024-07-22 DIAGNOSIS — O16.3 ELEVATED BLOOD PRESSURE AFFECTING PREGNANCY IN THIRD TRIMESTER, ANTEPARTUM (HHS-HCC): ICD-10-CM

## 2024-07-22 PROBLEM — K21.9 GASTROESOPHAGEAL REFLUX DISEASE: Status: ACTIVE | Noted: 2024-07-22

## 2024-07-22 LAB
ABO GROUP (TYPE) IN BLOOD: NORMAL
ALBUMIN SERPL BCP-MCNC: 3.2 G/DL (ref 3.4–5)
ALP SERPL-CCNC: 86 U/L (ref 33–110)
ALT SERPL W P-5'-P-CCNC: 15 U/L (ref 7–45)
ANION GAP SERPL CALC-SCNC: 15 MMOL/L (ref 10–20)
ANTIBODY SCREEN: NORMAL
AST SERPL W P-5'-P-CCNC: 14 U/L (ref 9–39)
BILIRUB SERPL-MCNC: 0.4 MG/DL (ref 0–1.2)
BUN SERPL-MCNC: 7 MG/DL (ref 6–23)
CALCIUM SERPL-MCNC: 8.6 MG/DL (ref 8.6–10.6)
CHLORIDE SERPL-SCNC: 105 MMOL/L (ref 98–107)
CO2 SERPL-SCNC: 19 MMOL/L (ref 21–32)
CREAT SERPL-MCNC: 0.43 MG/DL (ref 0.5–1.05)
EGFRCR SERPLBLD CKD-EPI 2021: >90 ML/MIN/1.73M*2
ERYTHROCYTE [DISTWIDTH] IN BLOOD BY AUTOMATED COUNT: 13.6 % (ref 11.5–14.5)
GLUCOSE SERPL-MCNC: 81 MG/DL (ref 74–99)
HCT VFR BLD AUTO: 36.9 % (ref 36–46)
HGB BLD-MCNC: 12.3 G/DL (ref 12–16)
MCH RBC QN AUTO: 28.5 PG (ref 26–34)
MCHC RBC AUTO-ENTMCNC: 33.3 G/DL (ref 32–36)
MCV RBC AUTO: 85 FL (ref 80–100)
NRBC BLD-RTO: 0.2 /100 WBCS (ref 0–0)
PLATELET # BLD AUTO: 202 X10*3/UL (ref 150–450)
POTASSIUM SERPL-SCNC: 3.7 MMOL/L (ref 3.5–5.3)
PROT SERPL-MCNC: 6.2 G/DL (ref 6.4–8.2)
RBC # BLD AUTO: 4.32 X10*6/UL (ref 4–5.2)
RH FACTOR (ANTIGEN D): NORMAL
SODIUM SERPL-SCNC: 135 MMOL/L (ref 136–145)
TREPONEMA PALLIDUM IGG+IGM AB [PRESENCE] IN SERUM OR PLASMA BY IMMUNOASSAY: NONREACTIVE
WBC # BLD AUTO: 12.6 X10*3/UL (ref 4.4–11.3)

## 2024-07-22 PROCEDURE — 10907ZC DRAINAGE OF AMNIOTIC FLUID, THERAPEUTIC FROM PRODUCTS OF CONCEPTION, VIA NATURAL OR ARTIFICIAL OPENING: ICD-10-PCS | Performed by: STUDENT IN AN ORGANIZED HEALTH CARE EDUCATION/TRAINING PROGRAM

## 2024-07-22 PROCEDURE — 3E033VJ INTRODUCTION OF OTHER HORMONE INTO PERIPHERAL VEIN, PERCUTANEOUS APPROACH: ICD-10-PCS | Performed by: STUDENT IN AN ORGANIZED HEALTH CARE EDUCATION/TRAINING PROGRAM

## 2024-07-22 PROCEDURE — 1100000001 HC PRIVATE ROOM DAILY

## 2024-07-22 PROCEDURE — 01967 NEURAXL LBR ANES VAG DLVR: CPT | Performed by: ANESTHESIOLOGY

## 2024-07-22 PROCEDURE — 10H07YZ INSERTION OF OTHER DEVICE INTO PRODUCTS OF CONCEPTION, VIA NATURAL OR ARTIFICIAL OPENING: ICD-10-PCS

## 2024-07-22 PROCEDURE — 87081 CULTURE SCREEN ONLY: CPT

## 2024-07-22 PROCEDURE — 59050 FETAL MONITOR W/REPORT: CPT

## 2024-07-22 PROCEDURE — 7210000002 HC LABOR PER HOUR

## 2024-07-22 PROCEDURE — 2720000007 HC OR 272 NO HCPCS

## 2024-07-22 PROCEDURE — 86920 COMPATIBILITY TEST SPIN: CPT

## 2024-07-22 PROCEDURE — 2500000005 HC RX 250 GENERAL PHARMACY W/O HCPCS: Performed by: NURSE ANESTHETIST, CERTIFIED REGISTERED

## 2024-07-22 PROCEDURE — 01967 NEURAXL LBR ANES VAG DLVR: CPT | Performed by: NURSE ANESTHETIST, CERTIFIED REGISTERED

## 2024-07-22 PROCEDURE — 51701 INSERT BLADDER CATHETER: CPT

## 2024-07-22 PROCEDURE — 86901 BLOOD TYPING SEROLOGIC RH(D): CPT

## 2024-07-22 PROCEDURE — 10H073Z INSERTION OF MONITORING ELECTRODE INTO PRODUCTS OF CONCEPTION, VIA NATURAL OR ARTIFICIAL OPENING: ICD-10-PCS

## 2024-07-22 PROCEDURE — 59409 OBSTETRICAL CARE: CPT | Performed by: STUDENT IN AN ORGANIZED HEALTH CARE EDUCATION/TRAINING PROGRAM

## 2024-07-22 PROCEDURE — 86780 TREPONEMA PALLIDUM: CPT

## 2024-07-22 PROCEDURE — 36415 COLL VENOUS BLD VENIPUNCTURE: CPT

## 2024-07-22 PROCEDURE — 85027 COMPLETE CBC AUTOMATED: CPT

## 2024-07-22 PROCEDURE — 4A1H7CZ MONITORING OF PRODUCTS OF CONCEPTION, CARDIAC RATE, VIA NATURAL OR ARTIFICIAL OPENING: ICD-10-PCS

## 2024-07-22 PROCEDURE — 2500000004 HC RX 250 GENERAL PHARMACY W/ HCPCS (ALT 636 FOR OP/ED)

## 2024-07-22 PROCEDURE — 99223 1ST HOSP IP/OBS HIGH 75: CPT | Performed by: STUDENT IN AN ORGANIZED HEALTH CARE EDUCATION/TRAINING PROGRAM

## 2024-07-22 PROCEDURE — 86923 COMPATIBILITY TEST ELECTRIC: CPT

## 2024-07-22 PROCEDURE — 2500000005 HC RX 250 GENERAL PHARMACY W/O HCPCS

## 2024-07-22 PROCEDURE — 3700000014 EPIDURAL BLOCK: Performed by: NURSE ANESTHETIST, CERTIFIED REGISTERED

## 2024-07-22 PROCEDURE — 80053 COMPREHEN METABOLIC PANEL: CPT

## 2024-07-22 PROCEDURE — 59409 OBSTETRICAL CARE: CPT

## 2024-07-22 RX ORDER — METHYLERGONOVINE MALEATE 0.2 MG/ML
0.2 INJECTION INTRAVENOUS ONCE AS NEEDED
Status: DISCONTINUED | OUTPATIENT
Start: 2024-07-22 | End: 2024-07-23 | Stop reason: HOSPADM

## 2024-07-22 RX ORDER — OXYTOCIN 10 [USP'U]/ML
10 INJECTION, SOLUTION INTRAMUSCULAR; INTRAVENOUS ONCE AS NEEDED
Status: DISCONTINUED | OUTPATIENT
Start: 2024-07-22 | End: 2024-07-23 | Stop reason: HOSPADM

## 2024-07-22 RX ORDER — TERBUTALINE SULFATE 1 MG/ML
0.25 INJECTION SUBCUTANEOUS ONCE AS NEEDED
Status: DISCONTINUED | OUTPATIENT
Start: 2024-07-22 | End: 2024-07-23 | Stop reason: HOSPADM

## 2024-07-22 RX ORDER — HYDRALAZINE HYDROCHLORIDE 20 MG/ML
5 INJECTION INTRAMUSCULAR; INTRAVENOUS ONCE AS NEEDED
Status: DISCONTINUED | OUTPATIENT
Start: 2024-07-22 | End: 2024-07-23 | Stop reason: HOSPADM

## 2024-07-22 RX ORDER — METOCLOPRAMIDE 10 MG/1
10 TABLET ORAL EVERY 6 HOURS PRN
Status: DISCONTINUED | OUTPATIENT
Start: 2024-07-22 | End: 2024-07-23

## 2024-07-22 RX ORDER — CEFAZOLIN SODIUM 2 G/100ML
2 INJECTION, SOLUTION INTRAVENOUS ONCE
Status: COMPLETED | OUTPATIENT
Start: 2024-07-22 | End: 2024-07-22

## 2024-07-22 RX ORDER — TRANEXAMIC ACID 100 MG/ML
1000 INJECTION, SOLUTION INTRAVENOUS ONCE AS NEEDED
Status: DISCONTINUED | OUTPATIENT
Start: 2024-07-22 | End: 2024-07-23 | Stop reason: HOSPADM

## 2024-07-22 RX ORDER — ONDANSETRON HYDROCHLORIDE 2 MG/ML
4 INJECTION, SOLUTION INTRAVENOUS EVERY 6 HOURS PRN
Status: DISCONTINUED | OUTPATIENT
Start: 2024-07-22 | End: 2024-07-23

## 2024-07-22 RX ORDER — MISOPROSTOL 200 UG/1
800 TABLET ORAL ONCE AS NEEDED
Status: DISCONTINUED | OUTPATIENT
Start: 2024-07-22 | End: 2024-07-23 | Stop reason: HOSPADM

## 2024-07-22 RX ORDER — FENTANYL/BUPIVACAINE/NS/PF 2MCG/ML-.1
0-54 PLASTIC BAG, INJECTION (ML) INJECTION CONTINUOUS
Status: DISCONTINUED | OUTPATIENT
Start: 2024-07-22 | End: 2024-07-23

## 2024-07-22 RX ORDER — LABETALOL HYDROCHLORIDE 5 MG/ML
20 INJECTION, SOLUTION INTRAVENOUS ONCE AS NEEDED
Status: DISCONTINUED | OUTPATIENT
Start: 2024-07-22 | End: 2024-07-23 | Stop reason: HOSPADM

## 2024-07-22 RX ORDER — METOCLOPRAMIDE HYDROCHLORIDE 5 MG/ML
10 INJECTION INTRAMUSCULAR; INTRAVENOUS EVERY 6 HOURS PRN
Status: DISCONTINUED | OUTPATIENT
Start: 2024-07-22 | End: 2024-07-23

## 2024-07-22 RX ORDER — FENTANYL/BUPIVACAINE/NS/PF 2MCG/ML-.1
PLASTIC BAG, INJECTION (ML) INJECTION AS NEEDED
Status: DISCONTINUED | OUTPATIENT
Start: 2024-07-22 | End: 2024-07-22

## 2024-07-22 RX ORDER — LOPERAMIDE HYDROCHLORIDE 2 MG/1
4 CAPSULE ORAL EVERY 2 HOUR PRN
Status: DISCONTINUED | OUTPATIENT
Start: 2024-07-22 | End: 2024-07-23 | Stop reason: HOSPADM

## 2024-07-22 RX ORDER — CEFAZOLIN SODIUM 1 G/50ML
1 SOLUTION INTRAVENOUS EVERY 8 HOURS
Status: DISCONTINUED | OUTPATIENT
Start: 2024-07-22 | End: 2024-07-23

## 2024-07-22 RX ORDER — SODIUM CHLORIDE, SODIUM LACTATE, POTASSIUM CHLORIDE, CALCIUM CHLORIDE 600; 310; 30; 20 MG/100ML; MG/100ML; MG/100ML; MG/100ML
125 INJECTION, SOLUTION INTRAVENOUS CONTINUOUS
Status: DISCONTINUED | OUTPATIENT
Start: 2024-07-22 | End: 2024-07-23

## 2024-07-22 RX ORDER — NIFEDIPINE 10 MG/1
10 CAPSULE ORAL ONCE AS NEEDED
Status: DISCONTINUED | OUTPATIENT
Start: 2024-07-22 | End: 2024-07-23 | Stop reason: HOSPADM

## 2024-07-22 RX ORDER — CARBOPROST TROMETHAMINE 250 UG/ML
250 INJECTION, SOLUTION INTRAMUSCULAR ONCE AS NEEDED
Status: DISCONTINUED | OUTPATIENT
Start: 2024-07-22 | End: 2024-07-23 | Stop reason: HOSPADM

## 2024-07-22 RX ORDER — OXYTOCIN/0.9 % SODIUM CHLORIDE 30/500 ML
2-30 PLASTIC BAG, INJECTION (ML) INTRAVENOUS CONTINUOUS
Status: DISCONTINUED | OUTPATIENT
Start: 2024-07-22 | End: 2024-07-23

## 2024-07-22 RX ORDER — OXYTOCIN/0.9 % SODIUM CHLORIDE 30/500 ML
60 PLASTIC BAG, INJECTION (ML) INTRAVENOUS ONCE AS NEEDED
Status: COMPLETED | OUTPATIENT
Start: 2024-07-22 | End: 2024-07-22

## 2024-07-22 RX ORDER — LIDOCAINE HYDROCHLORIDE 10 MG/ML
30 INJECTION INFILTRATION; PERINEURAL ONCE AS NEEDED
Status: DISCONTINUED | OUTPATIENT
Start: 2024-07-22 | End: 2024-07-23 | Stop reason: HOSPADM

## 2024-07-22 RX ORDER — ONDANSETRON 4 MG/1
4 TABLET, FILM COATED ORAL EVERY 6 HOURS PRN
Status: DISCONTINUED | OUTPATIENT
Start: 2024-07-22 | End: 2024-07-23

## 2024-07-22 SDOH — SOCIAL STABILITY: SOCIAL INSECURITY: DO YOU FEEL ANYONE HAS EXPLOITED OR TAKEN ADVANTAGE OF YOU FINANCIALLY OR OF YOUR PERSONAL PROPERTY?: NO

## 2024-07-22 SDOH — SOCIAL STABILITY: SOCIAL INSECURITY: HAVE YOU HAD THOUGHTS OF HARMING ANYONE ELSE?: NO

## 2024-07-22 SDOH — HEALTH STABILITY: MENTAL HEALTH: SUICIDAL BEHAVIOR (LIFETIME): NO

## 2024-07-22 SDOH — SOCIAL STABILITY: SOCIAL INSECURITY: VERBAL ABUSE: DENIES

## 2024-07-22 SDOH — HEALTH STABILITY: MENTAL HEALTH: WISH TO BE DEAD (PAST 1 MONTH): NO

## 2024-07-22 SDOH — HEALTH STABILITY: MENTAL HEALTH
STRENGTHS (MUST CHOOSE TWO): STABLE DOMESTIC SITUATION;ADEQUATE FINANCIAL RESOURCES;SUPPORT FROM FAMILY;SUPPORT FROM FRIENDS

## 2024-07-22 SDOH — SOCIAL STABILITY: SOCIAL INSECURITY: ABUSE SCREEN: ADULT

## 2024-07-22 SDOH — HEALTH STABILITY: MENTAL HEALTH: HAVE YOU USED ANY SUBSTANCES (CANABIS, COCAINE, HEROIN, HALLUCINOGENS, INHALANTS, ETC.) IN THE PAST 12 MONTHS?: NO

## 2024-07-22 SDOH — SOCIAL STABILITY: SOCIAL INSECURITY: HAS ANYONE EVER THREATENED TO HURT YOUR FAMILY OR YOUR PETS?: NO

## 2024-07-22 SDOH — SOCIAL STABILITY: SOCIAL INSECURITY: ARE YOU OR HAVE YOU BEEN THREATENED OR ABUSED PHYSICALLY, EMOTIONALLY, OR SEXUALLY BY ANYONE?: NO

## 2024-07-22 SDOH — HEALTH STABILITY: MENTAL HEALTH: WERE YOU ABLE TO COMPLETE ALL THE BEHAVIORAL HEALTH SCREENINGS?: YES

## 2024-07-22 SDOH — HEALTH STABILITY: MENTAL HEALTH: NON-SPECIFIC ACTIVE SUICIDAL THOUGHTS (PAST 1 MONTH): NO

## 2024-07-22 SDOH — ECONOMIC STABILITY: HOUSING INSECURITY: DO YOU FEEL UNSAFE GOING BACK TO THE PLACE WHERE YOU ARE LIVING?: NO

## 2024-07-22 SDOH — SOCIAL STABILITY: SOCIAL INSECURITY: ARE THERE ANY APPARENT SIGNS OF INJURIES/BEHAVIORS THAT COULD BE RELATED TO ABUSE/NEGLECT?: NO

## 2024-07-22 SDOH — SOCIAL STABILITY: SOCIAL INSECURITY: PHYSICAL ABUSE: DENIES

## 2024-07-22 SDOH — SOCIAL STABILITY: SOCIAL INSECURITY: DOES ANYONE TRY TO KEEP YOU FROM HAVING/CONTACTING OTHER FRIENDS OR DOING THINGS OUTSIDE YOUR HOME?: NO

## 2024-07-22 SDOH — SOCIAL STABILITY: SOCIAL INSECURITY: HAVE YOU HAD ANY THOUGHTS OF HARMING ANYONE ELSE?: NO

## 2024-07-22 SDOH — HEALTH STABILITY: MENTAL HEALTH: CURRENT SMOKER: 0

## 2024-07-22 SDOH — HEALTH STABILITY: MENTAL HEALTH: HAVE YOU USED ANY PRESCRIPTION DRUGS OTHER THAN PRESCRIBED IN THE PAST 12 MONTHS?: NO

## 2024-07-22 ASSESSMENT — LIFESTYLE VARIABLES
AUDIT-C TOTAL SCORE: 0
SKIP TO QUESTIONS 9-10: 1
AUDIT-C TOTAL SCORE: 0
HOW OFTEN DO YOU HAVE A DRINK CONTAINING ALCOHOL: NEVER
HOW MANY STANDARD DRINKS CONTAINING ALCOHOL DO YOU HAVE ON A TYPICAL DAY: PATIENT DOES NOT DRINK
HOW OFTEN DO YOU HAVE 6 OR MORE DRINKS ON ONE OCCASION: NEVER

## 2024-07-22 ASSESSMENT — PATIENT HEALTH QUESTIONNAIRE - PHQ9
SUM OF ALL RESPONSES TO PHQ9 QUESTIONS 1 & 2: 0
1. LITTLE INTEREST OR PLEASURE IN DOING THINGS: NOT AT ALL
2. FEELING DOWN, DEPRESSED OR HOPELESS: NOT AT ALL

## 2024-07-22 ASSESSMENT — PAIN SCALES - GENERAL
PAINLEVEL_OUTOF10: 0 - NO PAIN
PAINLEVEL_OUTOF10: 2
PAINLEVEL_OUTOF10: 0 - NO PAIN
PAINLEVEL_OUTOF10: 2
PAINLEVEL_OUTOF10: 2

## 2024-07-22 ASSESSMENT — ACTIVITIES OF DAILY LIVING (ADL): LACK_OF_TRANSPORTATION: NO

## 2024-07-22 NOTE — ANESTHESIA PREPROCEDURE EVALUATION
Patient: Elma Valenzuela    Evaluation Method: In-person visit    Procedure Information    Date: 07/22/24  Procedure: Labor Consult         Relevant Problems   Neuro   (+) Bipolar disease during pregnancy in second trimester (Multi)   (+) PTSD (post-traumatic stress disorder)      GI   (+) Gastroesophageal reflux disease      Endocrine   (+) Obesity affecting pregnancy ([facility])      GYN   (+) 27 weeks gestation of pregnancy ([facility])       Clinical information reviewed:    Allergies  Meds               NPO Detail:  No data recorded     OB/Gyn Evaluation    Present Pregnancy    Patient is pregnant now.   Obstetric History    (+) hypertensive disorder of pregnancy - chronic hypertension              Physical Exam    Airway  Mallampati: I  TM distance: >3 FB  Neck ROM: full     Cardiovascular    Dental   Comments: Chipped tooth in back lower   Pulmonary - normal exam     Abdominal            Anesthesia Plan    History of general anesthesia?: yes  History of complications of general anesthesia?: no    ASA 3     epidural     Anesthetic plan and risks discussed with patient.  Use of blood products discussed with patient who consented to blood products.    Plan discussed with CAA.

## 2024-07-22 NOTE — ANESTHESIA PREPROCEDURE EVALUATION
Patient: Elma Valenzuela    Evaluation Method: In-person visit    Procedure Information    Date: 07/22/24  Procedure: Labor Consult         Relevant Problems   Anesthesia  No issues regional or general      Cardiac  ghtn      Pulmonary (within normal limits)      Neuro   (+) Bipolar disease during pregnancy in second trimester (Multi)   (+) PTSD (post-traumatic stress disorder)      GI   (+) Gastroesophageal reflux disease      /Renal (within normal limits)      Liver (within normal limits)      Endocrine   (+) Obesity affecting pregnancy ([facility])      Hematology (within normal limits)      Musculoskeletal (within normal limits)      GYN   (+) 27 weeks gestation of pregnancy ([facility])       Clinical information reviewed:    Allergies  Meds               NPO Detail:  No data recorded  Solid food 1330 7/22     OB/Gyn Evaluation    Present Pregnancy    Patient is pregnant now.   Obstetric History    (+) hypertensive disorder of pregnancy - chronic hypertension              Physical Exam    Airway  Mallampati: I  TM distance: >3 FB  Neck ROM: full     Cardiovascular - normal exam  Rhythm: regular     Dental   Comments: Chipped tooth in back lower   Pulmonary - normal exam  Breath sounds clear to auscultation     Abdominal            Anesthesia Plan    History of general anesthesia?: yes  History of complications of general anesthesia?: no    ASA 3     epidural     The patient is not a current smoker.    Anesthetic plan and risks discussed with patient.  Use of blood products discussed with patient who consented to blood products.    Plan discussed with attending.

## 2024-07-22 NOTE — SIGNIFICANT EVENT
"Labor Progress Note    Pt resting comfortably in bed.    Vitals: /64   Pulse 80   Temp (!) 35.4 °C (95.7 °F)   Resp 16   Ht 1.6 m (5' 3\")   Wt 138 kg (304 lb 14.3 oz)   SpO2 99%   BMI 54.01 kg/m²     SVE: 5/70/-3  FHT: 150/mod/+accel/+ variable decels  [place]: CTX q1-3 min    A/P:  Continue to monitor for cervical change  Continue pitocin per protocol, currently at 10  CEFM, currently Category II with variable decels, reassuring given moderate variability and accel  Given difficulty tracing contractions and variable decels, placed IUPC for better monitoring, will consider amnioinfusion if necessary, discussed with pt and answered all questions  Epidural infusing    Julissa Atkinson MD PGY-1   OB/GYN    "

## 2024-07-22 NOTE — SIGNIFICANT EVENT
Labor Progress Note     Pt resting comfortably in bed.    SVE: 5/70/-3  FHT: 145/mod/+accel/-decels  [place]: CTX q2-3 min     A/P:  Continue to monitor for cervical change  Continue pitocin per protocol, currently at 10  CEFM, currently Category I since FSE placement   Given difficulty tracing FHT and previous variable decels, placed FSE with good monitoring since placement   Epidural infusing     Julissa Atkinson MD PGY-1   OB/GYN

## 2024-07-22 NOTE — ANESTHESIA PROCEDURE NOTES
Epidural Block    Patient location during procedure: OB  Start time: 7/22/2024 4:08 PM  End time: 7/22/2024 4:21 PM  Reason for block: labor analgesia  Staffing  Performed: CRNA   Authorized by: Mervin Brody DO    Performed by: MARCO A Luciano    Preanesthetic Checklist  Completed: patient identified, IV checked, risks and benefits discussed, surgical consent, monitors and equipment checked, pre-op evaluation, timeout performed and sterile techniques followed  Block Timeout  RN/Licensed healthcare professional reads aloud to the Anesthesia provider and entire team: Patient identity, procedure with side and site, patient position, and as applicable the availability of implants/special equipment/special requirements.  Patient on coagulant treatment: no  Timeout performed at: 7/22/2024 4:08 PM  Block Placement  Patient position: sitting  Prep: ChloraPrep  Sterility prep: cap, drape, gloves, hand and mask  Sedation level: no sedation  Patient monitoring: continuous pulse oximetry, heart rate and blood pressure  Approach: midline  Local numbing: lidocaine 1% to skin and subcutaneous tissues  Vertebral space: lumbar  Lumbar location: L3-L4  Epidural  Loss of resistance technique: saline  Guidance: landmark technique        Needle  Needle type: Tuohy   Needle gauge: 17  Needle length: 9 cm  Needle insertion depth: 6 cm  Catheter type: multi-orifice  Catheter at skin depth: 11 cm  Catheter securement method: clear occlusive dressing and liquid medical adhesive    Test dose: lidocaine 1.5% with epinephrine 1-to-200,000  Test dose: lidocaine 1.5% with epinephrine 1-to-200,000  Test dose result: no positive test dose    PCEA  Medication concentration used: 0.044% Bupivacaine with 1.25 mcg/mL Fentanyl and 1:[id number] Epinephrine  Dose (mL): 10  Lockout (minutes): 15  1-Hour Limit (boluses/hr): 4  Basal Rate: 14        Assessment  Block outcome: pain improved  Number of attempts: 1  Events: no positive test  dose  Procedure assessment: patient tolerated procedure well with no immediate complications  Additional Notes  +BABATUNDE with first pass

## 2024-07-22 NOTE — H&P
Obstetrical Admission History and Physical  Assessment/Plan  Elma Valenzuela is a 29 y.o.  at 37w1d, KARINA: 2024, by Ultrasound admitted for scheduled IOL in the setting of gHTN.  Prenatal care at [facility] with Dr. Worrell.    IOL  Admitted, consented, scanned, cephalic  Will induce with Pitocin  Epidural requested   GBS unknown, collected on admission . Administering Ancef.  EFW 2800g (2633 22%ile at 36w4d, AC 24%ile)  CEFM, currently Cat I  Delivery plan: patient desires vaginal delivery, patient counseled on risks of labor, vaginal delivery, and possibility of C/S for varying maternal or fetal indications    gHTN  Denies history of chronic HTN (no diagnosis on chart review)  Documented mild range Bps during pregnancy. On an office note from 2024 her BP was 161/88 and then 178/83 on recheck. No information provided in chart about duration of time between these two checks (unclear if >4 hrs apart, no dx of sPEC at that time)  Currently Asx  HELLP labs pending on admission  If another severe range BP, will meet criteria for sPEC    GBS: unknown status  No GBS swab collected in pregnancy  No GBS bacteruria or UTI in this pregnancy  Hx GBS in previous pregnancies, no subsequent  sepsis or NICU stay  PCN allergy (itching)  Following  protocols for shared decision making in this scenario, providers discussed options for GBS PPX including no Abx or a non-PCN ppx option. Decision made to provide ancef as ppx to avoid PCN but still provide coverage.   GBS swab collected      Pregnancy notables:   BMI > 50  Hx gHTN in 2 prior pregnancies, no confirmed PEC or sPEC dx  PTSD  Per chart review hx bipolar disorder  Hx postpartum depression  70+ lb weight gain in pregnancy  GBS unknown    Postpartum planning:  Contraception: unsure at this time, possibly Nexplanon. Signed tubal papers, which were lost.  Feeding: Breastfeeding, formula to supplement if needed    Patient seen and discussed with   "Danielle Rodriguez MD  OBGYN    Subjective   Elma is feeling good today, denies LOF, strong Ctx, vaginal bleeding, and vaginal discharge.  Denies headache, chest pain, SOB, RUQ pain, vision changes.    No hx asthma  No hx abdominopelvic surgery    Hx  x4, no delivery complications, no hx lacerations.  Reports having quick inductions (5 hrs) in previous pregnancies and \"easy, quick deliveries.\"    Hx postpartum depression. Originally diagnosed with bipolar disorder, put on Effexor which worsened her mental health. Additional provider sought for second opinion re-dx with postpartum depression, started on Wellbutrin, and pt responded well. Not currently on medications, aware of risk of postpartum depression because of history.    Joined by her , Te.    Obstetrical History   OB History    Para Term  AB Living   5 4 3 1 0 4   SAB IAB Ectopic Multiple Live Births           4      # Outcome Date GA Lbr Ascencion/2nd Weight Sex Type Anes PTL Lv   5 Current            4   35w0d  2.722 kg M Vag-Spont EPI  CHIOMA   3 Term  39w0d   M Vag-Spont EPI  CHIOMA   2 Term    2.835 kg M Vag-Spont EPI N    1 Term    2.892 kg F Vag-Spont EPI N CHIOMA       Past Medical History  Past Medical History:   Diagnosis Date    Anxiety disorder, unspecified     Anxiety    Bipolar 1 disorder (Multi)     Personal history of other infectious and parasitic diseases     History of varicella    Personal history of other mental and behavioral disorders     History of depression    PTSD (post-traumatic stress disorder)          Past Surgical History   Past Surgical History:   Procedure Laterality Date    OTHER SURGICAL HISTORY  2014    Dental Surgery         Social History  Social History     Tobacco Use    Smoking status: Never    Smokeless tobacco: Never   Substance Use Topics    Alcohol use: Not Currently     Substance and Sexual Activity   Drug Use Never     Allergies  Amoxicillin and Shrimp "     Medications  Medications Prior to Admission   Medication Sig Dispense Refill Last Dose    BABY ASPIRIN ORAL Take by mouth.   7/22/2024 at 0700    prenatal no115/iron/folic acid (PRENATAL 19 ORAL) Take by mouth.   7/22/2024 at 0700       Objective    Last Vitals  Temp Pulse Resp BP MAP O2 Sat   36.5 °C (97.7 °F) 83 18 130/72   98 %     Physical Examination  General: no acute distress  HEENT: normocephalic, atraumatic  Heart: warm and well perfused  Lungs: breathing comfortably on room air  Abdomen: gravid  Extremities: moving all extremities  Neuro: awake and conversant  Psych: appropriate mood and affect    SVE: 3/50/-3. GBS swab collected.  FHT: 140/mod/+accel/-decel  [place]: q 3 minutes    BSUS:   Position: Cephalic   Scanned with Dr. Santos    Lab Review  Labs in chart have been reviewed.    Results from last 7 days   Lab Units 07/22/24  1251   WBC AUTO x10*3/uL 12.6*   HEMOGLOBIN g/dL 12.3   HEMATOCRIT % 36.9   PLATELETS AUTO x10*3/uL 202     Lab Results   Component Value Date    WBC 12.6 (H) 07/22/2024    HGB 12.3 07/22/2024    HCT 36.9 07/22/2024     07/22/2024    ALT 10 07/08/2024    AST 12 07/08/2024     06/13/2024    K 3.5 06/13/2024     06/13/2024    CREATININE 0.60 07/08/2024    BUN 6 (L) 07/08/2024    CO2 21 (L) 06/13/2024    HGBA1C 4.7 03/28/2024

## 2024-07-22 NOTE — PROGRESS NOTES
Intrapartum Progress Note    Assessment/Plan   Elma Valenzuela is a 29 y.o.  at 37w1d, KARINA: 2024, by 27w4d Ultrasound admitted for scheduled IOL in the s/o T. Prenatal care at [facility] with Dr. Worrell.     IOL  Method: pitocin, currently running at 10   AROM for clear fluid at 1730  Pain management: epidural in place  CEFM, currently Category II with rare variable decels, reassured by accels and moderate variability   GBS: unknown with GBS in previous pregnancy, penicillin allergy; on ancef  Next exam: per pt request/ as clinically indicated  EFW 2800g (2633 22%ile at 36w4d, AC 24%ile)    gHTN  Denies history of chronic HTN (no diagnosis on chart review)  Documented mild range Bps during pregnancy. On an office note from 2024 her BP was 161/88 and then 178/83 on recheck. No information provided in chart about duration of time between these two checks (unclear if >4 hrs apart, no dx of sPEC at that time)  Currently Asx  HELLP labs negx1  If another severe range BP, will meet criteria for sPEC     GBS: unknown status  No GBS swabs collected, bacteruria or UTI in this pregnancy; swabbed   Hx GBS in previous pregnancies, no subsequent  sepsis or NICU stay  PCN allergy (itching); on ancef for ppx     Pregnancy notables:   BMI > 50 with 70+ lb weight gain in pregnancy  Hx gHTN in 2 prior pregnancies, no confirmed PEC or sPEC dx  PTSD and postpartum depression hx; additional hx of bipolar on chart review     Postpartum planning:  Contraception: possibly nexplanon  Feeding: Breastfeeding, formula to supplement if needed    Seen and discussed with Dr. Katja Atkinson MD PGY-1   OBGYN    Subjective   Pt is lying comfortably in bed. No complaints at this time. Denies HA, CP, SOB, RUQ pain and vision changes. Would like delayed cord clamping and has no other requests or questions at this time.     Objective   Last Vitals:  Temp Pulse Resp BP MAP Pulse Ox   36.1 °C (97 °F) 76 16  127/62   99 %     Vitals Min/Max Last 24 Hours:  Temp  Min: 36.1 °C (97 °F)  Max: 36.5 °C (97.7 °F)  Pulse  Min: 75  Max: 97  Resp  Min: 16  Max: 20  BP  Min: 125/66  Max: 181/95    Physical Examination:  General: no acute distress  HEENT: normocephalic, atraumatic  Heart: warm and well perfused  Lungs: breathing comfortably on room air  Abdomen: gravid  Extremities: moving all extremities  Neuro: awake and conversant  Psych: appropriate mood and affect    SVE: 4/70/-3  FHT: 140/mod/+accel/+ rare variable decels  [place]: q2-4min    Lab Review:  Labs in chart have been reviewed.  Hb 12.3, Rh pos  Cr 0.43, ALT 15, AST 14, Plt 202  Syphilis non-reactive

## 2024-07-22 NOTE — ASSESSMENT & PLAN NOTE
Dx with bipolar disorder after pregnancy, took Effexor which worsened her mental health. Was subsequently dx with postpartum depression and was put on Wellbutrin temporarily. Not currently on antidepressants or other medications for mental health.

## 2024-07-23 ENCOUNTER — ANESTHESIA EVENT (OUTPATIENT)
Dept: OBSTETRICS AND GYNECOLOGY | Facility: HOSPITAL | Age: 29
End: 2024-07-23
Payer: COMMERCIAL

## 2024-07-23 ENCOUNTER — ANESTHESIA (OUTPATIENT)
Dept: OBSTETRICS AND GYNECOLOGY | Facility: HOSPITAL | Age: 29
End: 2024-07-23
Payer: COMMERCIAL

## 2024-07-23 LAB
ABO GROUP (TYPE) IN BLOOD: NORMAL
APTT PPP: 29 SECONDS (ref 27–38)
BASOPHILS # BLD AUTO: 0.05 X10*3/UL (ref 0–0.1)
BASOPHILS NFR BLD AUTO: 0.5 %
EOSINOPHIL # BLD AUTO: 0.15 X10*3/UL (ref 0–0.7)
EOSINOPHIL NFR BLD AUTO: 1.5 %
ERYTHROCYTE [DISTWIDTH] IN BLOOD BY AUTOMATED COUNT: 13.7 % (ref 11.5–14.5)
ERYTHROCYTE [DISTWIDTH] IN BLOOD BY AUTOMATED COUNT: 13.7 % (ref 11.5–14.5)
ERYTHROCYTE [DISTWIDTH] IN BLOOD BY AUTOMATED COUNT: 13.8 % (ref 11.5–14.5)
FIBRINOGEN PPP-MCNC: 377 MG/DL (ref 200–400)
HCT VFR BLD AUTO: 32.4 % (ref 36–46)
HCT VFR BLD AUTO: 32.8 % (ref 36–46)
HCT VFR BLD AUTO: 35.2 % (ref 36–46)
HGB BLD-MCNC: 10.8 G/DL (ref 12–16)
HGB BLD-MCNC: 10.9 G/DL (ref 12–16)
HGB BLD-MCNC: 12 G/DL (ref 12–16)
IMM GRANULOCYTES # BLD AUTO: 0.06 X10*3/UL (ref 0–0.7)
IMM GRANULOCYTES NFR BLD AUTO: 0.6 % (ref 0–0.9)
INR PPP: 1 (ref 0.9–1.1)
LYMPHOCYTES # BLD AUTO: 1.48 X10*3/UL (ref 1.2–4.8)
LYMPHOCYTES NFR BLD AUTO: 14.8 %
MCH RBC QN AUTO: 28.1 PG (ref 26–34)
MCH RBC QN AUTO: 28.3 PG (ref 26–34)
MCH RBC QN AUTO: 28.4 PG (ref 26–34)
MCHC RBC AUTO-ENTMCNC: 32.9 G/DL (ref 32–36)
MCHC RBC AUTO-ENTMCNC: 33.6 G/DL (ref 32–36)
MCHC RBC AUTO-ENTMCNC: 34.1 G/DL (ref 32–36)
MCV RBC AUTO: 83 FL (ref 80–100)
MCV RBC AUTO: 84 FL (ref 80–100)
MCV RBC AUTO: 86 FL (ref 80–100)
MONOCYTES # BLD AUTO: 0.6 X10*3/UL (ref 0.1–1)
MONOCYTES NFR BLD AUTO: 6 %
NEUTROPHILS # BLD AUTO: 7.63 X10*3/UL (ref 1.2–7.7)
NEUTROPHILS NFR BLD AUTO: 76.6 %
NRBC BLD-RTO: 0 /100 WBCS (ref 0–0)
PLATELET # BLD AUTO: 173 X10*3/UL (ref 150–450)
PLATELET # BLD AUTO: 180 X10*3/UL (ref 150–450)
PLATELET # BLD AUTO: 198 X10*3/UL (ref 150–450)
PROTHROMBIN TIME: 10.7 SECONDS (ref 9.8–12.8)
RBC # BLD AUTO: 3.81 X10*6/UL (ref 4–5.2)
RBC # BLD AUTO: 3.88 X10*6/UL (ref 4–5.2)
RBC # BLD AUTO: 4.23 X10*6/UL (ref 4–5.2)
RH FACTOR (ANTIGEN D): NORMAL
WBC # BLD AUTO: 10 X10*3/UL (ref 4.4–11.3)
WBC # BLD AUTO: 11.2 X10*3/UL (ref 4.4–11.3)
WBC # BLD AUTO: 16.6 X10*3/UL (ref 4.4–11.3)

## 2024-07-23 PROCEDURE — 85610 PROTHROMBIN TIME: CPT | Performed by: STUDENT IN AN ORGANIZED HEALTH CARE EDUCATION/TRAINING PROGRAM

## 2024-07-23 PROCEDURE — 2500000004 HC RX 250 GENERAL PHARMACY W/ HCPCS (ALT 636 FOR OP/ED)

## 2024-07-23 PROCEDURE — 1100000001 HC PRIVATE ROOM DAILY

## 2024-07-23 PROCEDURE — 36415 COLL VENOUS BLD VENIPUNCTURE: CPT | Performed by: NURSE PRACTITIONER

## 2024-07-23 PROCEDURE — 7100000016 HC LABOR RECOVERY PER HOUR

## 2024-07-23 PROCEDURE — 88307 TISSUE EXAM BY PATHOLOGIST: CPT | Mod: TC,SUR

## 2024-07-23 PROCEDURE — P9016 RBC LEUKOCYTES REDUCED: HCPCS

## 2024-07-23 PROCEDURE — 3700000014 HC AN EPIDURAL BLOCK CHARGE: Performed by: SPECIALIST

## 2024-07-23 PROCEDURE — 85027 COMPLETE CBC AUTOMATED: CPT | Performed by: STUDENT IN AN ORGANIZED HEALTH CARE EDUCATION/TRAINING PROGRAM

## 2024-07-23 PROCEDURE — 7220000003 HC JADA OB SPECIAL CARE, 4 HOURS

## 2024-07-23 PROCEDURE — 36415 COLL VENOUS BLD VENIPUNCTURE: CPT

## 2024-07-23 PROCEDURE — 0W3R7ZZ CONTROL BLEEDING IN GENITOURINARY TRACT, VIA NATURAL OR ARTIFICIAL OPENING: ICD-10-PCS | Performed by: SPECIALIST

## 2024-07-23 PROCEDURE — 85027 COMPLETE CBC AUTOMATED: CPT | Performed by: NURSE PRACTITIONER

## 2024-07-23 PROCEDURE — 85025 COMPLETE CBC W/AUTO DIFF WBC: CPT

## 2024-07-23 PROCEDURE — 2720000007 HC OR 272 NO HCPCS: Performed by: SPECIALIST

## 2024-07-23 PROCEDURE — 10D17ZZ EXTRACTION OF PRODUCTS OF CONCEPTION, RETAINED, VIA NATURAL OR ARTIFICIAL OPENING: ICD-10-PCS | Performed by: SPECIALIST

## 2024-07-23 PROCEDURE — 88305 TISSUE EXAM BY PATHOLOGIST: CPT | Mod: TC,SUR

## 2024-07-23 PROCEDURE — 2500000001 HC RX 250 WO HCPCS SELF ADMINISTERED DRUGS (ALT 637 FOR MEDICARE OP)

## 2024-07-23 PROCEDURE — 7100000016 HC LABOR RECOVERY PER HOUR: Performed by: SPECIALIST

## 2024-07-23 PROCEDURE — 36430 TRANSFUSION BLD/BLD COMPNT: CPT | Mod: GC

## 2024-07-23 PROCEDURE — 59812 TREATMENT OF MISCARRIAGE: CPT | Performed by: SPECIALIST

## 2024-07-23 PROCEDURE — 2500000005 HC RX 250 GENERAL PHARMACY W/O HCPCS

## 2024-07-23 PROCEDURE — 85384 FIBRINOGEN ACTIVITY: CPT | Performed by: STUDENT IN AN ORGANIZED HEALTH CARE EDUCATION/TRAINING PROGRAM

## 2024-07-23 RX ORDER — MIDAZOLAM HYDROCHLORIDE 1 MG/ML
INJECTION INTRAMUSCULAR; INTRAVENOUS AS NEEDED
Status: DISCONTINUED | OUTPATIENT
Start: 2024-07-23 | End: 2024-07-23

## 2024-07-23 RX ORDER — DIPHENHYDRAMINE HCL 25 MG
25 CAPSULE ORAL EVERY 6 HOURS PRN
Status: DISCONTINUED | OUTPATIENT
Start: 2024-07-23 | End: 2024-07-25 | Stop reason: HOSPADM

## 2024-07-23 RX ORDER — NIFEDIPINE 10 MG/1
10 CAPSULE ORAL ONCE AS NEEDED
Status: DISCONTINUED | OUTPATIENT
Start: 2024-07-23 | End: 2024-07-25 | Stop reason: HOSPADM

## 2024-07-23 RX ORDER — LOPERAMIDE HYDROCHLORIDE 2 MG/1
4 CAPSULE ORAL EVERY 2 HOUR PRN
Status: DISCONTINUED | OUTPATIENT
Start: 2024-07-23 | End: 2024-07-25 | Stop reason: HOSPADM

## 2024-07-23 RX ORDER — DIPHENHYDRAMINE HYDROCHLORIDE 50 MG/ML
25 INJECTION INTRAMUSCULAR; INTRAVENOUS ONCE
Status: DISCONTINUED | OUTPATIENT
Start: 2024-07-23 | End: 2024-07-23

## 2024-07-23 RX ORDER — OXYTOCIN/0.9 % SODIUM CHLORIDE 30/500 ML
PLASTIC BAG, INJECTION (ML) INTRAVENOUS CONTINUOUS PRN
Status: DISCONTINUED | OUTPATIENT
Start: 2024-07-23 | End: 2024-07-23

## 2024-07-23 RX ORDER — SODIUM CITRATE AND CITRIC ACID MONOHYDRATE 334; 500 MG/5ML; MG/5ML
SOLUTION ORAL AS NEEDED
Status: DISCONTINUED | OUTPATIENT
Start: 2024-07-23 | End: 2024-07-23

## 2024-07-23 RX ORDER — ONDANSETRON 4 MG/1
4 TABLET, FILM COATED ORAL EVERY 6 HOURS PRN
Status: DISCONTINUED | OUTPATIENT
Start: 2024-07-23 | End: 2024-07-25 | Stop reason: HOSPADM

## 2024-07-23 RX ORDER — BISACODYL 10 MG/1
10 SUPPOSITORY RECTAL DAILY PRN
Status: DISCONTINUED | OUTPATIENT
Start: 2024-07-23 | End: 2024-07-25 | Stop reason: HOSPADM

## 2024-07-23 RX ORDER — POLYETHYLENE GLYCOL 3350 17 G/17G
17 POWDER, FOR SOLUTION ORAL 2 TIMES DAILY PRN
Status: DISCONTINUED | OUTPATIENT
Start: 2024-07-23 | End: 2024-07-25 | Stop reason: HOSPADM

## 2024-07-23 RX ORDER — MAGNESIUM SULFATE HEPTAHYDRATE 40 MG/ML
2 INJECTION, SOLUTION INTRAVENOUS CONTINUOUS
Status: DISCONTINUED | OUTPATIENT
Start: 2024-07-23 | End: 2024-07-23

## 2024-07-23 RX ORDER — HYDRALAZINE HYDROCHLORIDE 20 MG/ML
5 INJECTION INTRAMUSCULAR; INTRAVENOUS ONCE AS NEEDED
Status: DISCONTINUED | OUTPATIENT
Start: 2024-07-23 | End: 2024-07-25 | Stop reason: HOSPADM

## 2024-07-23 RX ORDER — MISOPROSTOL 200 UG/1
800 TABLET ORAL ONCE AS NEEDED
Status: DISCONTINUED | OUTPATIENT
Start: 2024-07-23 | End: 2024-07-25 | Stop reason: HOSPADM

## 2024-07-23 RX ORDER — HYDROMORPHONE HYDROCHLORIDE 1 MG/ML
INJECTION, SOLUTION INTRAMUSCULAR; INTRAVENOUS; SUBCUTANEOUS
Status: COMPLETED
Start: 2024-07-23 | End: 2024-07-23

## 2024-07-23 RX ORDER — CARBOPROST TROMETHAMINE 250 UG/ML
250 INJECTION, SOLUTION INTRAMUSCULAR ONCE AS NEEDED
Status: COMPLETED | OUTPATIENT
Start: 2024-07-23 | End: 2024-07-23

## 2024-07-23 RX ORDER — OXYTOCIN 10 [USP'U]/ML
10 INJECTION, SOLUTION INTRAMUSCULAR; INTRAVENOUS ONCE AS NEEDED
Status: DISCONTINUED | OUTPATIENT
Start: 2024-07-23 | End: 2024-07-25 | Stop reason: HOSPADM

## 2024-07-23 RX ORDER — PHENYLEPHRINE 10 MG/250 ML(40 MCG/ML)IN 0.9 % SOD.CHLORIDE INTRAVENOUS
CONTINUOUS PRN
Status: DISCONTINUED | OUTPATIENT
Start: 2024-07-23 | End: 2024-07-23

## 2024-07-23 RX ORDER — ACETAMINOPHEN 325 MG/1
975 TABLET ORAL EVERY 6 HOURS
Status: DISCONTINUED | OUTPATIENT
Start: 2024-07-23 | End: 2024-07-25 | Stop reason: HOSPADM

## 2024-07-23 RX ORDER — OXYCODONE HYDROCHLORIDE 5 MG/1
5 TABLET ORAL ONCE
Status: COMPLETED | OUTPATIENT
Start: 2024-07-23 | End: 2024-07-23

## 2024-07-23 RX ORDER — FAMOTIDINE 10 MG/ML
INJECTION INTRAVENOUS AS NEEDED
Status: DISCONTINUED | OUTPATIENT
Start: 2024-07-23 | End: 2024-07-23

## 2024-07-23 RX ORDER — ACETAMINOPHEN 325 MG/1
975 TABLET ORAL ONCE
Status: DISCONTINUED | OUTPATIENT
Start: 2024-07-23 | End: 2024-07-23

## 2024-07-23 RX ORDER — SIMETHICONE 80 MG
80 TABLET,CHEWABLE ORAL 4 TIMES DAILY PRN
Status: DISCONTINUED | OUTPATIENT
Start: 2024-07-23 | End: 2024-07-25 | Stop reason: HOSPADM

## 2024-07-23 RX ORDER — ENOXAPARIN SODIUM 100 MG/ML
80 INJECTION SUBCUTANEOUS EVERY 24 HOURS
Status: DISCONTINUED | OUTPATIENT
Start: 2024-07-23 | End: 2024-07-25 | Stop reason: HOSPADM

## 2024-07-23 RX ORDER — METOCLOPRAMIDE HYDROCHLORIDE 5 MG/ML
10 INJECTION INTRAMUSCULAR; INTRAVENOUS ONCE
Status: DISCONTINUED | OUTPATIENT
Start: 2024-07-23 | End: 2024-07-23

## 2024-07-23 RX ORDER — DIPHENHYDRAMINE HYDROCHLORIDE 50 MG/ML
25 INJECTION INTRAMUSCULAR; INTRAVENOUS EVERY 6 HOURS PRN
Status: DISCONTINUED | OUTPATIENT
Start: 2024-07-23 | End: 2024-07-25 | Stop reason: HOSPADM

## 2024-07-23 RX ORDER — IBUPROFEN 600 MG/1
600 TABLET ORAL EVERY 6 HOURS
Status: DISCONTINUED | OUTPATIENT
Start: 2024-07-23 | End: 2024-07-25 | Stop reason: HOSPADM

## 2024-07-23 RX ORDER — ADHESIVE BANDAGE
10 BANDAGE TOPICAL
Status: DISCONTINUED | OUTPATIENT
Start: 2024-07-23 | End: 2024-07-25 | Stop reason: HOSPADM

## 2024-07-23 RX ORDER — CALCIUM GLUCONATE 98 MG/ML
1 INJECTION, SOLUTION INTRAVENOUS ONCE AS NEEDED
Status: DISCONTINUED | OUTPATIENT
Start: 2024-07-23 | End: 2024-07-23

## 2024-07-23 RX ORDER — METHYLERGONOVINE MALEATE 0.2 MG/ML
0.2 INJECTION INTRAVENOUS ONCE AS NEEDED
Status: DISCONTINUED | OUTPATIENT
Start: 2024-07-23 | End: 2024-07-25 | Stop reason: HOSPADM

## 2024-07-23 RX ORDER — ONDANSETRON HYDROCHLORIDE 2 MG/ML
4 INJECTION, SOLUTION INTRAVENOUS EVERY 6 HOURS PRN
Status: DISCONTINUED | OUTPATIENT
Start: 2024-07-23 | End: 2024-07-25 | Stop reason: HOSPADM

## 2024-07-23 RX ORDER — LABETALOL HYDROCHLORIDE 5 MG/ML
20 INJECTION, SOLUTION INTRAVENOUS ONCE AS NEEDED
Status: DISCONTINUED | OUTPATIENT
Start: 2024-07-23 | End: 2024-07-25 | Stop reason: HOSPADM

## 2024-07-23 RX ORDER — TRANEXAMIC ACID 100 MG/ML
1000 INJECTION, SOLUTION INTRAVENOUS ONCE AS NEEDED
Status: DISCONTINUED | OUTPATIENT
Start: 2024-07-23 | End: 2024-07-25 | Stop reason: HOSPADM

## 2024-07-23 RX ORDER — CEFAZOLIN 1 G/1
INJECTION, POWDER, FOR SOLUTION INTRAVENOUS AS NEEDED
Status: DISCONTINUED | OUTPATIENT
Start: 2024-07-23 | End: 2024-07-23

## 2024-07-23 RX ORDER — HYDROMORPHONE HYDROCHLORIDE 1 MG/ML
0.8 INJECTION, SOLUTION INTRAMUSCULAR; INTRAVENOUS; SUBCUTANEOUS ONCE
Status: COMPLETED | OUTPATIENT
Start: 2024-07-23 | End: 2024-07-23

## 2024-07-23 RX ORDER — LIDOCAINE 560 MG/1
1 PATCH PERCUTANEOUS; TOPICAL; TRANSDERMAL
Status: DISCONTINUED | OUTPATIENT
Start: 2024-07-23 | End: 2024-07-25 | Stop reason: HOSPADM

## 2024-07-23 RX ORDER — HYDROMORPHONE HYDROCHLORIDE 1 MG/ML
0.2 INJECTION, SOLUTION INTRAMUSCULAR; INTRAVENOUS; SUBCUTANEOUS EVERY 5 MIN PRN
OUTPATIENT
Start: 2024-07-23

## 2024-07-23 RX ORDER — BUPIVACAINE HYDROCHLORIDE 7.5 MG/ML
INJECTION, SOLUTION INTRASPINAL AS NEEDED
Status: DISCONTINUED | OUTPATIENT
Start: 2024-07-23 | End: 2024-07-23

## 2024-07-23 RX ORDER — OXYTOCIN/0.9 % SODIUM CHLORIDE 30/500 ML
60 PLASTIC BAG, INJECTION (ML) INTRAVENOUS ONCE AS NEEDED
Status: DISCONTINUED | OUTPATIENT
Start: 2024-07-23 | End: 2024-07-25 | Stop reason: HOSPADM

## 2024-07-23 SDOH — HEALTH STABILITY: MENTAL HEALTH: CURRENT SMOKER: 0

## 2024-07-23 ASSESSMENT — PAIN SCALES - GENERAL
PAINLEVEL_OUTOF10: 0 - NO PAIN
PAINLEVEL_OUTOF10: 5 - MODERATE PAIN
PAINLEVEL_OUTOF10: 3
PAINLEVEL_OUTOF10: 0 - NO PAIN
PAINLEVEL_OUTOF10: 6
PAINLEVEL_OUTOF10: 7
PAINLEVEL_OUTOF10: 3
PAINLEVEL_OUTOF10: 0 - NO PAIN
PAINLEVEL_OUTOF10: 7
PAINLEVEL_OUTOF10: 0 - NO PAIN
PAINLEVEL_OUTOF10: 7
PAINLEVEL_OUTOF10: 5 - MODERATE PAIN
PAINLEVEL_OUTOF10: 0 - NO PAIN

## 2024-07-23 ASSESSMENT — PAIN DESCRIPTION - DESCRIPTORS
DESCRIPTORS: CRAMPING

## 2024-07-23 ASSESSMENT — PAIN DESCRIPTION - LOCATION
LOCATION: ABDOMEN

## 2024-07-23 ASSESSMENT — PAIN SCALES - PAIN ASSESSMENT IN ADVANCED DEMENTIA (PAINAD): TOTALSCORE: MEDICATION (SEE MAR)

## 2024-07-23 NOTE — SIGNIFICANT EVENT
Called by RN to the room for increased, heavy bleeding with clots noted in the toilet.  Difficult to assess fundus d/t body habitus.  Pt. Denies any pain or dizziness/lightheadedness.  Upon VE, vault noted to be full of clots, evacuated.  Pt. Uncomfortable with continued exam.  Dilaudid 0.8mg IVP now, Hemabate 0.2IM  VSS through out, pt. Alert and conversing, apologizing.  Pleasant.  When pt. More comfortable, VE done and clots removed from cervix.  Fundus now felt and firm/midline.  Total ebl 475ml + approx, 200ml in toilet and surrounding area. (675ml ebl)  Pt. Tolerated procedure well  Dr. Santos updated and ultrasound taken to bedside.  CBC now    Bonnie Goldsmith, CNP

## 2024-07-23 NOTE — ANESTHESIA PROCEDURE NOTES
Peripheral IV  Date/Time: 7/23/2024 3:45 PM      Placement  Needle size: 18 G  Laterality: left  Location: hand  Local anesthetic: injectable  Site prep: chlorhexidine  Technique: anatomical landmarks  Attempts: 1

## 2024-07-23 NOTE — ANESTHESIA PREPROCEDURE EVALUATION
Patient: Elma Valenzuela    Evaluation Method: In-person visit    Procedure Information    Date: 07/22/24  Procedure: Labor Consult         Relevant Problems   Anesthesia  No issues regional or general      Cardiac  ghtn      Pulmonary (within normal limits)      Neuro   (+) Bipolar disease during pregnancy in second trimester (Multi)   (+) PTSD (post-traumatic stress disorder)      GI   (+) Gastroesophageal reflux disease      /Renal (within normal limits)      Liver (within normal limits)      Endocrine   (+) Obesity affecting pregnancy ([facility])      Hematology (within normal limits)      Musculoskeletal (within normal limits)      GYN   (+) 27 weeks gestation of pregnancy ([facility])       Clinical information reviewed:    Allergies  Meds  Problems              NPO Detail:  NPO/Void Status  Date of Last Solid: 07/23/24  Time of Last Solid: 1200  Last Intake Type: Solid meal      Solid food 1330 7/22     OB/Gyn Evaluation    Present Pregnancy     Obstetric History    (+) hypertensive disorder of pregnancy - chronic hypertension          Physical Exam    Airway  Mallampati: I  TM distance: >3 FB  Neck ROM: full     Cardiovascular - normal exam  Rhythm: regular     Dental   Comments: Chipped tooth in back lower   Pulmonary - normal exam  Breath sounds clear to auscultation     Abdominal          Anesthesia Plan    History of general anesthesia?: yes  History of complications of general anesthesia?: no    ASA 3 - emergent     spinal   (Discussed risks and benefits of spinal vs general, consented for both. )  The patient is not a current smoker.    Anesthetic plan and risks discussed with patient.  Use of blood products discussed with patient who consented to blood products.    Plan discussed with attending.

## 2024-07-23 NOTE — ANESTHESIA POSTPROCEDURE EVALUATION
Patient: Elma Valenzuela    Procedure Summary       Date: 07/23/24 Room / Location:     Anesthesia Start: 1513 Anesthesia Stop: 1635    Procedure: Procedure Not Yet Scheduled Diagnosis:     Scheduled Providers:  Responsible Provider: Alexander Arias MD    Anesthesia Type: spinal ASA Status: 3 - Emergent            Anesthesia Type: spinal    Vitals Value Taken Time   /53 07/23/24 1627   Temp 35.9 °C (96.6 °F) 07/23/24 1626   Pulse 71 07/23/24 1636   Resp 14 07/23/24 1641   SpO2 100 % 07/23/24 1636       Anesthesia Post Evaluation    Patient location during evaluation: floor  Patient participation: complete - patient participated  Level of consciousness: awake and alert  Pain management: adequate  Multimodal analgesia pain management approach  Airway patency: patent  Cardiovascular status: hemodynamically stable  Respiratory status: room air and acceptable  Hydration status: acceptable  Postoperative Nausea and Vomiting: none        No notable events documented.

## 2024-07-23 NOTE — NURSING NOTE
Pt's  called out at approx 1400 that pt needs assistance in bathroom. Upon arrival to bathroom, floor splattered with blood, blood running down pt's leg, toilet bloody. Pt assisted back to bed where fundus was noted to be boggy and clots were expressed. Bonnie Goldsmith to bedside for manual removal of clots. 380 ml of measured clot/blood+ bloodied bathroom=approx 500 ml. Dr. Santos to bedside to ultrasound, discussion with patient to transfer to L&D for D&C.

## 2024-07-23 NOTE — SIGNIFICANT EVENT
"Elma is a 29 y.o.  on postpartum day 1 s/p . EBL per chart review 100ccs since arrival.    Subjective  Called by postpartum team to room for increased bleeding. 375ccs evacuated by Bonnie Goldsmith. Hemabate given. CBC drawn. EBL from evacuation and from pad weights + toilet: 600ccs.    Pt has hx gHTN in this pregnancy and in prior pregnancies.   No Hx asthma.     Objective  /82   Pulse 90   Temp 36.8 °C (98.2 °F)   Resp 18   Ht 1.6 m (5' 3\")   Wt 138 kg (304 lb 14.3 oz)   SpO2 98%   Breastfeeding Yes   BMI 54.01 kg/m²     Lab Results   Component Value Date    WBC 12.6 (H) 2024    HGB 12.3 2024    HCT 36.9 2024    MCV 85 2024     2024      On bedside ultrasound clot remained in uterus with some evidence of active bleeding on color flow.  Discussed options with patient including additional bimanual exam at bedside vs. D&C in the OR. Patient opting for D&C to allow for a spinal for pain relief.    Total EBL while inpatient: 700    A&P  Reviewed risks of D&C including bleeding, risk for uterine perforation, and hysterectomy. Patient expressed understanding.    - Transfer to L&D for D&C   - CBC pending, will monitor   - Crossed    Patient seen, examined, and discussed with Dr. Sharma and Dr. Danielle Rodriguez MD, MPH  Obstetrics & Gynecology, PGY-1    "

## 2024-07-23 NOTE — ANESTHESIA POSTPROCEDURE EVALUATION
Patient: Elma Valenzuela is a 29 y.o., , who had a Vaginal, Spontaneous delivery on 2024 at 37w1d and is now POD1.    She had Neuraxial Anesthesia without immediate complications noted.       Pain well controlled    Vitals:    24 0359   BP: 121/76   Pulse: 90   Resp: 18   Temp: 36.1 °C (97 °F)   SpO2: 97%       Neuraxial site assessed. No visible redness or swelling or drainage. Patient able to ambulate and move all extremities without difficulty. Able to void. No complaints of nausea/vomiting. Tolerating PO intake well. No s/sx of PDPH.     Anesthesia will sign off     Thor Arteaga, CAA

## 2024-07-23 NOTE — DISCHARGE INSTRUCTIONS

## 2024-07-23 NOTE — L&D DELIVERY NOTE
OB Delivery Note  2024  Elma Valenzuela  29 y.o.   Vaginal, Spontaneous     29 y.o.  underwent induction in the s/o [facility] and delivered at 37w1d.     Fetal descent noted throughout maternal pushing efforts. Uncomplicated . Postpartum pitocin bolus initiated. Vigorous infant placed on maternal abdomen for skin to skin. Delayed cord clamping. Placenta delivered spontaneously and intact with gentle traction on umbilical cord. Fundus palpated firm, midline, and at umbilicus. Vagina, perineum, and labia inspected. No lacerations noted. Placenta intact but poorly attached to membranes. Bedside US was performed with thin endometrial stripe without concern for retained products of conception.     Gestational Age: 37w1d  /Para:   Quantitative Blood Loss: Admission to Discharge: 0 mL (2024 10:40 AM - 2024 11:30 PM)    Bennett Valenzuela [[id number]]      Labor Events    Rupture date/time: 2024 1733  Rupture type: Artificial  Fluid color: Clear  Fluid odor: None  Labor type: Induced Onset of Labor  Labor allowed to proceed with plans for an attempted vaginal birth?: Yes  Induction: Oxytocin, AROM  First cervical ripening date/time: 2024 1330  Induction date/time: 2024 1040  Induction indications: Hypertensive Disorder of Pregnancy  Complications: None       Labor Event Times    Labor onset date/time: 2024 1040  Dilation complete date/time: 2024  Start pushing date/time: 2024       Placenta    Placenta delivery date/time: 2024 230  Placenta removal: Spontaneous  Placenta disposition: lab       Cord    Vessels: 3 vessels  Delayed cord clamping?: Yes  Cord blood disposition: Lab  Gases sent?: No  Stem cell collection (by provider): No       Lacerations    Episiotomy: None  Perineal laceration: None  Other lacerations?: No  Repair suture: None       Anesthesia    Method: Epidural       Operative Delivery    Forceps attempted?: No  Vacuum extractor  attempted?: No       Shoulder Dystocia    Shoulder dystocia present?: No       [place] Delivery    Birth date/time: 2024 23:01:00  Delivery type: Vaginal, Spontaneous  Complications: None       Resuscitation    Method: Tactile stimulation       Apgars    Living status: Living  Apgar Component Scores:  1 min.:  5 min.:  10 min.:  15 min.:  20 min.:    Skin color:  0  1       Heart rate:  2  2       Reflex irritability:  2  2       Muscle tone:  2  2       Respiratory effort:  2  2       Total:  8  9       Apgars assigned by: RONNIE RN       Delivery Providers    Delivering clinician: Opal Hightower MD   Provider Role    Eusebia Alvarez, RN Delivery Nurse    Enid Maciel, RN Nursery Nurse    Marcelle Atkinson MD Resident    Lesvia Grier MD Resident               Julissa Atkinson MD PGY-1

## 2024-07-23 NOTE — SIGNIFICANT EVENT
DANA eval    To the bedside to evquita Moffett. Tubing ~2/3 full. Suction turned off ballooon deflated. Will monitor for 1 hour.     Lesvia Grier MD, PGY-4

## 2024-07-23 NOTE — OP NOTE
Date: 2024  [place] [facility]    Name: Elma Valenzuela, : 1995, Age: 29 y.o., MRN: [id number], Sex: female    Diagnosis  Pre-op Diagnosis      * Delayed postpartum hemorrhage (HHS-HCC) [O72.2] * No Diagnosis Codes entered *     Procedures  * No procedures documented on diagnosis form *    Surgeons      * Henna Guzman - Primary    Resident/Fellow/Other Assistant:  Surgeons and Role:  * No surgeons found with a matching role *  MD Noemí Beach MD    Procedure Summary  Anesthesia: Anesthesia type not filed in the log.  ASA: ASA status not filed in the log.  Anesthesia Staff: No anesthesia staff entered.  Estimated Blood Loss: 944mL intraoperatively  Intra-op Medications: * Intraprocedure medication information is unavailable because the case start and end events have not been set *      Intraprocedure I/O Totals       None entered           Specimen: Endometrial Curettings     Procedure Details:    The patient was taken to the operating room where spinal anesthesia was administered. Prophylactic antibiotics were given. She was then positioned in the dorsal lithotomy position and a bimanual exam was performed. The patient was then prepped and draped in the normal sterile fashion. Next, retractors were placed in the vagina to allow for visualization of the cervix. The anterior lip of the cervix was grasped with ring forceps. The cervix was noted already to be dilated 3-4 cm, and bedside ultrasound revealed remaining clot and active bleeding in the uterus. The bladder was drained for 250ccs of light yellow urine. A 12mm curved suction curette was connected to the suction, placed in the cervix and a suction curettage was performed under ultrasound guidance. Several passes were made with the suction curettage until no further clot was visualized on ultrasound and a thin endometrial stripe was noted. Tissue was noted within the suction tubing and is included in the sample to be sent  to pathology for review. The uterine size decreased with these passes but additional bleeding was observed. At this time decision to place a Betina suction device was made. The Betina device balloon was deflated, passed digitally through the cervix up to the level of the cervix, and the balloon was re-inflated with 120mLs saline outside of the cervix. The Betina device was connected to suction. All instruments were removed from the vagina. All counts were correct. The specimen was sent to pathology. The patient tolerated the procedure well and returned to the room.    During the procedure, a CBC, coags, fibrinogen were collected. The patient received 1u pRBCs and a pitocin bolus. Starting hemoglobin was 12.0.     All products received:  - Hemabate (preoperatively)  - Pitocin bolus (intraoperatively, continued postoperatively)  - 1 unit pRBCs (intraoperatively, completed)    Total blood loss: 1644 mL  - 100ccs    - 700ccs postpartum floor   - 944ccs intraoperatively (as of )    Dr. Sharma was present for all key portions of the procedure.      Results from last 7 days   Lab Units 24  1556 24  1437 24  1251   WBC AUTO x10*3/uL 10.0 11.2 12.6*   HEMOGLOBIN g/dL 10.8* 12.0 12.3   HEMATOCRIT % 32.8* 35.2* 36.9   PLATELETS AUTO x10*3/uL 180 198 202   AST U/L  --   --  14   ALT U/L  --   --  15   CREATININE mg/dL  --   --  0.43*       Findings: Gravid uterus with uterine atony, small amount of tissue and membranes appeared to be removed with suction D&C, procedure  mL. Thin endometrial stripe by end of case. Betina placed at the end of the case.    Complications:  None; patient tolerated the procedure well.     Disposition: to L&D floor for observation  Condition: stable    Plan to evaluate Betina placement as well as blood loss in suction tubing and canister at 1730 on 2024.

## 2024-07-23 NOTE — SIGNIFICANT EVENT
Pt resting comfortably in bed.    FHT: 135/mod/+accel/intermittent small variable decel   [place]: CTX q2-3 min  SVE: 8/90/-1    Active labor  - continue to monitor for cervical change  - continue Pit per protocol, currently at 18  - anticipate start of 2nd stage shortly  - cefm; cat 2 currently for variables, but overall very reassuring with mod variability.    Lesvia Grier MD  [facility]

## 2024-07-23 NOTE — PROGRESS NOTES
Postpartum Progress Note    Assessment/Plan   Elma Valenzuela is a 29 y.o., , who delivered at 37w1d gestation and is now postpartum day 1.    Pregnancy notables:   - GBS unknown, giving ancef (shared decision making + itching w PCN)  - gHTN- asx, no meds  - BMI > 50  - Hx gHTN in 2 prior pregnancies, no confirmed PEC or sPEC dx    #Postpartum  - continue routine postpartum care  - pain well controlled on po medications  - dvt risk score   5 , for ppx lovenox      #Maternal Well-Being  - emotional support provided  - bonding with infant    # Feeding  - breastfeeding/pumping encouraged; lactation consult prn    #Dispo  - anticipate d/c on PPD #2 if meeting all postpartum milestones  - for f/u 4-6 weeks with Primary OB provider      Principal Problem:    Labor and delivery [facility]  Active Problems:    Bipolar disease during pregnancy in second trimester (Multi)    History of postpartum depression    Gastroesophageal reflux disease    Pregnancy Problems (from 24 to present)       Problem Noted Resolved    Labor and delivery indication for care or intervention ([facility]) 2024 by Noemí Rodriguez MD No    Priority:  Medium      Elevated blood pressure affecting pregnancy in third trimester, antepartum ([facility]) 2024 by Gilda Baca DO No    Priority:  Medium      27 weeks gestation of pregnancy ([facility]) 2024 by Tarik Salgado MD No    Priority:  Medium      Overview Signed 2024 12:29 PM by Tarik Salgado MD     Care at [facility] with Dr. Worrell         Obesity affecting pregnancy ([facility]) 5/15/2024 by Michele Li MD No    Priority:  Medium      Overview Signed 2024  4:10 PM by Tarik Salgado MD     Delivery at [facility] if BMI >50 at time of delivery         History of gestational hypertension 5/15/2024 by Michele Li MD No    Priority:  Medium      Overview Addendum 2024  3:55 PM by Michele Li MD     [facility] no confirmed PEC or sPEC dx  -Never had a magnesium requirement         PTSD (post-traumatic stress disorder) 5/15/2024 by Michele Li MD No    Priority:  Medium      Overview Signed 5/15/2024  5:05 PM by Michele Li MD     Watched her father take his own life at age 18         Bipolar disease during pregnancy in second trimester (Multi) 5/15/2024 by Michele Li MD No    Priority:  Medium      History of postpartum depression 5/15/2024 by Michele Li MD No    Priority:  Medium      Overview Addendum 5/15/2024  5:17 PM by Michele Li MD     Not currently on medication,  Patient also with history of depression outside of pregnancy               Hospital course: gestational hypertension  Vaginal Birth  Patient is currently breastfeedingThe patient's blood type is O POS. The baby's blood type is A POS. Rhogam is not indicated.    Subjective   Her pain is well controlled with current medications  She is passing flatus  She is ambulating well  She is tolerating a Adult diet Regular  She reports no breast or nursing problems  She denies emotional concerns today   Her plan for contraception is none     Ambulating in room, pleasant, feels well. States vag. Bleeding light, voiding, taking PO well.  Undecided on bcm    Objective   Allergies:   Amoxicillin and Shrimp         Last Vitals:  Temp Pulse Resp BP MAP Pulse Ox   36.1 °C (97 °F) 79 18 115/77   97 %     Vitals Min/Max Last 24 Hours:  Temp  Min: 35.4 °C (95.7 °F)  Max: 36.9 °C (98.4 °F)  Pulse  Min: 73  Max: 145  Resp  Min: 16  Max: 18  BP  Min: 102/57  Max: 181/95    Intake/Output:     Intake/Output Summary (Last 24 hours) at 7/23/2024 1218  Last data filed at 7/23/2024 0500  Gross per 24 hour   Intake 500 ml   Output 500 ml   Net 0 ml       Physical Exam:  General: Examination reveals a well developed, well nourished, female, in no acute distress. She is alert and cooperative.  Lungs: symmetrical, non-labored  breathing.  Cardiac: warm, well-perfused.  Abdomen: soft, non-tender.  Fundus: difficult to assess d/t body habitus  Extremities: no redness or tenderness in the calves or thighs.  Neurological: alert, oriented, normal speech, no focal findings or movement disorder noted.     Lab Data:  Lab Results   Component Value Date    WBC 12.6 (H) 07/22/2024    HGB 12.3 07/22/2024    HCT 36.9 07/22/2024     07/22/2024     Lab Results   Component Value Date    GLUCOSE 81 07/22/2024     (L) 07/22/2024    K 3.7 07/22/2024     07/22/2024    CO2 19 (L) 07/22/2024    ANIONGAP 15 07/22/2024    BUN 7 07/22/2024    CREATININE 0.43 (L) 07/22/2024    EGFR >90 07/22/2024    CALCIUM 8.6 07/22/2024    ALBUMIN 3.2 (L) 07/22/2024    PROT 6.2 (L) 07/22/2024    ALKPHOS 86 07/22/2024    ALT 15 07/22/2024    AST 14 07/22/2024    BILITOT 0.4 07/22/2024

## 2024-07-23 NOTE — SIGNIFICANT EVENT
Patient reported to have increased vaginal bleeding. Upon arrival to patient  room uterine atony noted and US demonstrated blood and debris within the endometrial canal with continued active bleeding and a QBL of 700cc.  Patient was counseled for a suction D&C and patient consented.

## 2024-07-23 NOTE — CARE PLAN
The patient's goals for the shift include patient will have adequate pain control throughout shift    The clinical goals for the shift include patient will have uncomplicated vaginal delivery    Patient t/f upstairs to [facility] 5, IV heplocked. Report given to Edu Alas RN.

## 2024-07-23 NOTE — SIGNIFICANT EVENT
"Labor Progress Note    Subjective: Pt is lying comfortably in bed feeling pressure. No complaints.     Objective:  Vitals: /69   Pulse 74   Temp 36.6 °C (97.9 °F)   Resp 16   Ht 1.6 m (5' 3\")   Wt 138 kg (304 lb 14.3 oz)   SpO2 97%   BMI 54.01 kg/m²   SVE: 7/80/-2, FSE and IUPC in place  FHT: 135/mod/+accel/+ rare variable decels  [place]: q1-3min    A/P:  Continue pitocin per protocol  CEFM, currently Category II, reassured by good variability and accels  Epidural infusing    Julissa Atkinson MD PGY-1   OB/GYN    "

## 2024-07-23 NOTE — PROGRESS NOTES
Social Work Assessment     Patient: Elma Valenzuela, 28yo,   Address: [address] [place] [address]  Phone: [phone]    Referral Reason: history of mental health    Prenatal Care: @[facility]  Barriers: none noted    [place] Name: Gary Sky, MR# [id number]  [place] : 24    Other Children: 8, 7, 6, 4    FOB:  Te Sky    Household Composition: Ms Valenzuela reports she lives with her , their now 5 children and their pets in a stable home, no concerns reported.    Supports: Ms Valenzuela reports her  is her primary support and is caring for older children this admission.     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 Ms Valenzuela reports family receives food stamps and medical benefits. She has not applied for WIC but is aware it is available if needed.     Mental Health Diagnoses/Concerns: Ms Valenzuela with a history of PTSD after the death of her father 10 years ago and postpartum depression after the birth of her second child. She states she was in counseling for several years after loss of her dad and on medicine for a time. She states her mental health has improved and she has not been in any treatment for several years. She reports her  is aware of history and symptoms and very supportive. She denies concerns at this time and reports her mood is good. No bonding concerns noted. SW reviewed postpartum depression signs, symptoms, and resources and Ms Valenzuela indicated understanding.    Assessment: SW met with Ms Valenzuela for assessment. She was accepting and engaged. She reports she has needed items for  and good support. No concerns noted.     Plan: Ms Valenzuela and  clear from SW perspective.     Signature: YISEL Vanessa

## 2024-07-23 NOTE — SIGNIFICANT EVENT
Betina in place off suction with balloon deflated x2 hours. Scant bleeding on pad. No additional blood in tubing. Betina removed. Will collect post-transfusion labs now (~4 hours post-transfusion). Pending labs, stable for transfer to post-partum    Lesvia Grier MD, PGY-4

## 2024-07-23 NOTE — ANESTHESIA PROCEDURE NOTES
Spinal Block    Patient location during procedure: OB  Start time: 7/23/2024 3:20 PM  End time: 7/23/2024 3:28 PM  Reason for block: primary anesthetic  Staffing  Performed: resident   Authorized by: Juana Poole MD    Performed by: Lc Aguilar MD    Preanesthetic Checklist  Completed: patient identified, IV checked, risks and benefits discussed, surgical consent, monitors and equipment checked, pre-op evaluation, timeout performed and sterile techniques followed  Block Timeout  RN/Licensed healthcare professional reads aloud to the Anesthesia provider and entire team: Patient identity, procedure with side and site, patient position, and as applicable the availability of implants/special equipment/special requirements.  Patient on coagulant treatment: no  Timeout performed at: 7/23/2024 3:18 PM  Spinal Block  Patient position: sitting  Prep: ChloraPrep  Sterility prep: mask, gloves, drape and cap  Sedation level: no sedation  Patient monitoring: heart rate, continuous pulse oximetry and blood pressure  Approach: midline  Vertebral space: L3-4  Needle  Needle type: pencil-point   Needle gauge: 25 G  Free flowing CSF: yes    Assessment  Sensory level: T8.  Procedure assessment: patient tolerated procedure well with no immediate complications

## 2024-07-24 PROBLEM — O16.3 ELEVATED BLOOD PRESSURE AFFECTING PREGNANCY IN THIRD TRIMESTER, ANTEPARTUM (HHS-HCC): Status: RESOLVED | Noted: 2024-06-13 | Resolved: 2024-07-24

## 2024-07-24 PROBLEM — O13.9 GESTATIONAL HYPERTENSION (HHS-HCC): Status: ACTIVE | Noted: 2024-07-24

## 2024-07-24 PROCEDURE — 2500000005 HC RX 250 GENERAL PHARMACY W/O HCPCS

## 2024-07-24 PROCEDURE — 2500000001 HC RX 250 WO HCPCS SELF ADMINISTERED DRUGS (ALT 637 FOR MEDICARE OP)

## 2024-07-24 PROCEDURE — 1100000001 HC PRIVATE ROOM DAILY

## 2024-07-24 RX ORDER — LIDOCAINE HYDROCHLORIDE 10 MG/ML
INJECTION INFILTRATION; PERINEURAL
Status: DISPENSED
Start: 2024-07-24 | End: 2024-07-25

## 2024-07-24 RX ORDER — LIDOCAINE HYDROCHLORIDE 10 MG/ML
5 INJECTION, SOLUTION EPIDURAL; INFILTRATION; INTRACAUDAL; PERINEURAL ONCE AS NEEDED
Status: DISCONTINUED | OUTPATIENT
Start: 2024-07-24 | End: 2024-07-25 | Stop reason: HOSPADM

## 2024-07-24 ASSESSMENT — PAIN SCALES - GENERAL
PAINLEVEL_OUTOF10: 3
PAINLEVEL_OUTOF10: 4
PAINLEVEL_OUTOF10: 3
PAINLEVEL_OUTOF10: 5 - MODERATE PAIN
PAINLEVEL_OUTOF10: 5 - MODERATE PAIN
PAINLEVEL_OUTOF10: 3
PAIN_LEVEL: 0

## 2024-07-24 ASSESSMENT — PAIN DESCRIPTION - LOCATION
LOCATION: BUTTOCKS
LOCATION: ABDOMEN

## 2024-07-24 ASSESSMENT — PAIN DESCRIPTION - DESCRIPTORS
DESCRIPTORS: CRAMPING
DESCRIPTORS: CRAMPING

## 2024-07-24 NOTE — CARE PLAN
Problem: Postpartum  Goal: Experiences normal postpartum course  Outcome: Met  Goal: Appropriate maternal -  bonding  Outcome: Met     Problem: Safety - Adult  Goal: Free from fall injury  Outcome: Met     Problem: Postpartum hemorrhage  Goal: Hemodynamic stability and limit blood loss  Outcome: Met     Problem: Pain - Adult  Goal: Verbalizes/displays adequate comfort level or baseline comfort level  Outcome: Met     The clinical goals for the shift include VSS, bleeding and swelling minimal, and pain controlled with medications.

## 2024-07-24 NOTE — CARE PLAN
The patient's vitals have remained within normal limits. Bleeding has been light and fundus firm and midline throughout shift. Pain has been managed with PO meds.

## 2024-07-24 NOTE — ANESTHESIA POSTPROCEDURE EVALUATION
Patient: Elma Valenzuela    Procedure Summary       Date: 24 Room / Location: [facility] OB 03 / Virtual [facility] 2 OB    Anesthesia Start: 1513 Anesthesia Stop: 1635    Procedure: D  and  C Suction Diagnosis:       Delayed postpartum hemorrhage (HHS-HCC)      (Delayed postpartum hemorrhage (HHS-HCC) [O72.2])    Surgeons: Henna Guzman MD Responsible Provider: Alexander Arias MD    Anesthesia Type: spinal ASA Status: 3 - Emergent          Elma Valenzuela is a 29 y.o., , who had a Vaginal, Spontaneous delivery on 2024 at 37w1d and is now POD2.    She had Neuraxial Anesthesia without immediate complications noted.       Pain well controlled    Vitals:    24   BP: 110/75   Pulse: 68   Resp: 16   Temp: 36.7 °C (98.1 °F)   SpO2: 98%       Neuraxial site assessed. No visible redness or swelling or drainage. Patient able to ambulate and move all extremities without difficulty. Able to void. No complaints of nausea/vomiting. Tolerating PO intake well. No s/sx of PDPH.     Anesthesia will sign off     ANDREI Almaguer-YOHAN    Anesthesia Type: spinal    Vitals Value Taken Time   /75 24 07   Temp 36.7 °C (98.1 °F) 24   Pulse 68 24 07   Resp 16 24   SpO2 98 % 24       Anesthesia Post Evaluation    Patient location during evaluation: bedside  Patient participation: complete - patient participated  Level of consciousness: awake and awake and alert  Pain score: 0  Pain management: adequate  Airway patency: patent  Cardiovascular status: acceptable  Respiratory status: acceptable  Hydration status: acceptable  Postoperative Nausea and Vomiting: none        No notable events documented.

## 2024-07-24 NOTE — LACTATION NOTE
Lactation Consultant Note       Infant has been mostly formula fed, some breast feeding attempts noted in infant's flowsheet. Per bedside RN mother choosing to not continue to breast feed and would like to move forward with feeding infant formula only; does not want to see lactation at this time.

## 2024-07-24 NOTE — PROGRESS NOTES
Postpartum Progress Note    Assessment/Plan   Elma Valenzuela is a 29 y.o., , who delivered at 37w1d gestation and is now postpartum day 2, post-op day 1 from D&C.    Pregnancy notables:   - GBS unknown, giving ancef (shared decision making + itching w PCN)  - gHTN- asx, no meds  - BMI > 50  - Hx gHTN in 2 prior pregnancies, no confirmed PEC or sPEC dx    #Postpartum  - continue routine postpartum care  - pain well controlled on po medications  - dvt risk score   5 , for ppx lovenox    #PPH  - PPD1 EBL 1640, s/p hemabate, D&C, 1 unit PRBC and Betina placement  - hgb 12 PPD1--> 10.8 after 1 U PRBC and D&C--> 10.9 PPD1/POD0  - VS stable and WNL, no S&S of bleeding or anemia  - Of note, WBC 10 POD0--> 16.6 POD1 after D&C, fundus nontender, no S&S infection, continue to monitor    gHTN  - Dx by mild range BPs > 4 hrs apart, severe range BP 6/13 and 7/22 in s/o epidural placement, no dx PEC with SF made d/t situational severe range BP  - BP WNL on no meds postpartum  - Asymptomatic  - HELLP labs negative  - BP cuff for home  - Discussed recommendation for 3 day stay, patient agreeable      #Maternal Well-Being  - emotional support provided  - bonding with infant  - Offered [facility] referral for h/o PPD and BPD, patient declines at this time    # Feeding  - breastfeeding/pumping encouraged; lactation consult prn    #Contraception  - nexplanon at PPV    #Dispo  - Anticipate DC PPD3 pending BP control.  - The signs and symptoms of PEC were reviewed with the patient, including unrelenting headache, vision changes/blurred vision, and pain underneath the right breast.   - BP cuff for home for checking BP BID. Pt instructed to call primary OB if SBP > 160 or DBP > 110.  - On discharge, follow up with primary OB in 2-5 days for BP check and 4-6 weeks for postpartum visit.        Principal Problem:    Vaginal delivery ([facility])  Active Problems:    Bipolar disease during pregnancy in second trimester (Multi)    History of  postpartum depression    Gastroesophageal reflux disease    Gestational hypertension ([facility])    Delayed postpartum hemorrhage ([facility])    Pregnancy Problems (from 05/16/24 to present)       Problem Noted Resolved    Labor and delivery indication for care or intervention ([facility]) 7/22/2024 by Noemí Rodriguez MD No    Priority:  Medium      Elevated blood pressure affecting pregnancy in third trimester, antepartum ([facility]) 6/13/2024 by Gilda Baca,  No    Priority:  Medium      27 weeks gestation of pregnancy ([facility]) 5/16/2024 by Tarik Salgado MD No    Priority:  Medium      Overview Signed 5/16/2024 12:29 PM by Tarik Salgado MD     Care at [facility] with Dr. Worrell         Obesity affecting pregnancy ([facility]) 5/15/2024 by Michele Li MD No    Priority:  Medium      Overview Signed 5/16/2024  4:10 PM by Tarik Salgado MD     Delivery at [facility] if BMI >50 at time of delivery         History of gestational hypertension 5/15/2024 by Michele Li MD No    Priority:  Medium      Overview Addendum 5/16/2024  3:55 PM by Michele Li MD     -gHTN in 2 prior pregnancies, no confirmed PEC or sPEC dx  -Never had a magnesium requirement         PTSD (post-traumatic stress disorder) 5/15/2024 by Michele Li MD No    Priority:  Medium      Overview Signed 5/15/2024  5:05 PM by Michele Li MD     Watched her father take his own life at age 18         Bipolar disease during pregnancy in second trimester (Multi) 5/15/2024 by Michele Li MD No    Priority:  Medium      History of postpartum depression 5/15/2024 by Michele Li MD No    Priority:  Medium      Overview Addendum 5/15/2024  5:17 PM by Michele Li MD     Not currently on medication,  Patient also with history of depression outside of pregnancy               Hospital course: gestational hypertension  Vaginal Birth  Patient is currently breastfeeding  Patient is not breastfeedingThe patient's blood type  "is O POS. The baby's blood type is A POS. Rhogam is not indicated.    Subjective   Her pain is well controlled with current medications  She is passing flatus  She is ambulating well  She is tolerating a Adult diet Regular  She reports no breast or nursing problems  She denies emotional concerns today   Her plan for contraception is none    Bleeding \"like a period\" without large clots. Denies dizziness/lightheadedness/LOC/SOB/uncontrolled bleeding  Denies HA, SOB, RUQ pain, vision changes.       Objective   Allergies:   Amoxicillin and Shrimp         Last Vitals:  Temp Pulse Resp BP MAP Pulse Ox   36.7 °C (98.1 °F) 68 16 110/75   98 %     Vitals Min/Max Last 24 Hours:  Temp  Min: 36.1 °C (97 °F)  Max: 36.8 °C (98.2 °F)  Pulse  Min: 65  Max: 90  Resp  Min: 16  Max: 19  BP  Min: 104/56  Max: 146/81    Intake/Output:     Intake/Output Summary (Last 24 hours) at 7/24/2024 1132  Last data filed at 7/23/2024 1623  Gross per 24 hour   Intake 1100 ml   Output 1890 ml   Net -790 ml       Physical Exam:  General: Examination reveals a well developed, well nourished, female, in no acute distress. She is alert and cooperative.  Lungs: symmetrical, non-labored breathing.  Cardiac: warm, well-perfused.  Abdomen: soft, non-tender.  Fundus: non-tender, difficult to fully assess 2/2 body habitus  Extremities: no redness or tenderness in the calves or thighs.  Neurological: alert, oriented, normal speech, no focal findings or movement disorder noted.     Lab Data:  Lab Results   Component Value Date    WBC 16.6 (H) 07/23/2024    HGB 10.9 (L) 07/23/2024    HCT 32.4 (L) 07/23/2024     07/23/2024     Lab Results   Component Value Date    GLUCOSE 81 07/22/2024     (L) 07/22/2024    K 3.7 07/22/2024     07/22/2024    CO2 19 (L) 07/22/2024    ANIONGAP 15 07/22/2024    BUN 7 07/22/2024    CREATININE 0.43 (L) 07/22/2024    EGFR >90 07/22/2024    CALCIUM 8.6 07/22/2024    ALBUMIN 3.2 (L) 07/22/2024    PROT 6.2 (L) " 07/22/2024    ALKPHOS 86 07/22/2024    ALT 15 07/22/2024    AST 14 07/22/2024    BILITOT 0.4 07/22/2024

## 2024-07-25 VITALS
SYSTOLIC BLOOD PRESSURE: 124 MMHG | HEART RATE: 85 BPM | RESPIRATION RATE: 16 BRPM | HEIGHT: 63 IN | DIASTOLIC BLOOD PRESSURE: 85 MMHG | OXYGEN SATURATION: 98 % | WEIGHT: 293 LBS | BODY MASS INDEX: 51.91 KG/M2 | TEMPERATURE: 98.4 F

## 2024-07-25 LAB
BLOOD EXPIRATION DATE: NORMAL
DISPENSE STATUS: NORMAL
GP B STREP GENITAL QL CULT: ABNORMAL
LABORATORY COMMENT REPORT: NORMAL
PATH REPORT.FINAL DX SPEC: NORMAL
PATH REPORT.GROSS SPEC: NORMAL
PATH REPORT.RELEVANT HX SPEC: NORMAL
PATH REPORT.TOTAL CANCER: NORMAL
PRODUCT BLOOD TYPE: 5100
PRODUCT BLOOD TYPE: 9500
PRODUCT CODE: NORMAL
UNIT ABO: NORMAL
UNIT NUMBER: NORMAL
UNIT RH: NORMAL
UNIT VOLUME: 272
UNIT VOLUME: 281
UNIT VOLUME: 297
UNIT VOLUME: 298
UNIT VOLUME: 350
XM INTEP: NORMAL

## 2024-07-25 PROCEDURE — 2500000004 HC RX 250 GENERAL PHARMACY W/ HCPCS (ALT 636 FOR OP/ED)

## 2024-07-25 PROCEDURE — 2500000001 HC RX 250 WO HCPCS SELF ADMINISTERED DRUGS (ALT 637 FOR MEDICARE OP)

## 2024-07-25 RX ORDER — IBUPROFEN 600 MG/1
600 TABLET ORAL EVERY 6 HOURS
Qty: 30 TABLET | Refills: 0 | Status: SHIPPED | OUTPATIENT
Start: 2024-07-25

## 2024-07-25 RX ORDER — ACETAMINOPHEN 325 MG/1
975 TABLET ORAL EVERY 6 HOURS
Qty: 90 TABLET | Refills: 0 | Status: SHIPPED | OUTPATIENT
Start: 2024-07-25

## 2024-07-25 ASSESSMENT — PAIN SCALES - GENERAL
PAINLEVEL_OUTOF10: 4
PAINLEVEL_OUTOF10: 5 - MODERATE PAIN

## 2024-07-25 ASSESSMENT — PAIN DESCRIPTION - DESCRIPTORS: DESCRIPTORS: SORE

## 2024-07-25 ASSESSMENT — PAIN DESCRIPTION - LOCATION
LOCATION: BACK
LOCATION: ABDOMEN

## 2024-07-25 NOTE — DISCHARGE SUMMARY
Discharge Summary    Admission Date: 7/22/2024  Discharge Date: 07/25/24      Discharge Diagnosis  Vaginal delivery ([facility])    Hospital Course  Delivery Date: 7/22/2024 11:01 PM  Delivery type: Vaginal, Spontaneous   GA at delivery: 37w1d  Outcome: Living  Anesthesia during delivery: Epidural  Intrapartum complications: None  Feeding method: Breastfeeding Status: No     Procedures:  D&C  Contraception at discharge: none  Wants nexplanon vs. TL at PP visit.  Declined bridge.  Ambulating in room with baby, very pleasant.  Voiding, taking PO well, vag. Bleeding light-no clots.  Conversation re: PPH on PPD1, pt. Coping well, no emotional concerns.  Has bp cuff and will follow up next week.    Pertinent Physical Exam At Time of Discharge  General: Examination reveals a well developed, well nourished, female, in no acute distress. She is alert and cooperative.  Lungs: symmetrical, non-labored breathing.  Cardiac: warm, well-perfused.  Abdomen: soft, non-tender.  Fundus: firm, at umbilicus, and nontender.  Extremities: no redness or tenderness in the calves or thighs.  Neurological: alert, oriented, normal speech, no focal findings or movement disorder noted.     Last Vitals:  Temp Pulse Resp BP MAP Pulse Ox   36.9 °C (98.4 °F) 85 16 124/85 98 98 %     Discharge Meds     Your medication list        START taking these medications        Instructions Last Dose Given Next Dose Due   acetaminophen 325 mg tablet  Commonly known as: Tylenol      Take 3 tablets (975 mg) by mouth every 6 hours.       ibuprofen 600 mg tablet      Take 1 tablet (600 mg) by mouth every 6 hours.              STOP taking these medications      BABY ASPIRIN ORAL        PRENATAL 19 ORAL                  Where to Get Your Medications        These medications were sent to [facility]/pharmacy #[id number] - [place] [id number] [place] [address]      Phone: [phone]   acetaminophen 325 mg tablet  ibuprofen  600 mg tablet          Complications Requiring Follow-Up  Heavy vaginal bleeding, passing clots, fever and/or chills      Test Results Pending At Discharge  Pending Labs       Order Current Status    Surgical Pathology Exam - PLACENTA In process    Surgical Pathology Exam - PRODUCTS OF CONCEPTION In process    Group B Streptococcus (GBS) Prenatal Screen, Culture Preliminary result            Outpatient Follow-Up  No future appointments.    I spent 20 minutes in the professional and overall care of this patient.      Bonnie Goldsmith, APRN-CNP

## 2024-07-26 LAB
LABORATORY COMMENT REPORT: NORMAL
PATH REPORT.FINAL DX SPEC: NORMAL
PATH REPORT.GROSS SPEC: NORMAL
PATH REPORT.RELEVANT HX SPEC: NORMAL
PATH REPORT.TOTAL CANCER: NORMAL

## 2024-07-30 ENCOUNTER — PATIENT OUTREACH (OUTPATIENT)
Dept: CARE COORDINATION | Facility: CLINIC | Age: 29
End: 2024-07-30
Payer: COMMERCIAL

## 2024-07-30 NOTE — PROGRESS NOTES
Outreach call to patient to support a smooth transition of care from recent admission.  Left voicemail message for patient with my contact information.    SUMEET Reveles   III  [facility]/Accountable Care Organization  Office Phone: [phone]  [email or web address]

## 2024-07-31 ENCOUNTER — PATIENT OUTREACH (OUTPATIENT)
Dept: CARE COORDINATION | Facility: CLINIC | Age: 29
End: 2024-07-31
Payer: COMMERCIAL

## 2024-07-31 NOTE — PROGRESS NOTES
Outreach call to patient to support a smooth transition of care from recent admission.  Left voicemail message for patient with my contact information.     SUMEET Reveles   III  [facility]/Accountable Care Organization  Office Phone: [phone]  [email or web address]

## 2025-05-14 ENCOUNTER — TELEPHONE (OUTPATIENT)
Dept: PRIMARY CARE | Facility: CLINIC | Age: 30
End: 2025-05-14
Payer: COMMERCIAL

## 2025-05-14 NOTE — TELEPHONE ENCOUNTER
Pt expressed wanting to find a doctor for whole family, central scheduling made appt.  LMOVM our office is Internal med and only sees pt over the age of 18. Also noted that Cyndy is a nurse practitioner.  Central scheduling made booking, unsure if pt aware provider is an NP.

## 2025-05-22 ENCOUNTER — APPOINTMENT (OUTPATIENT)
Dept: PRIMARY CARE | Facility: CLINIC | Age: 30
End: 2025-05-22
Payer: COMMERCIAL

## (undated) DEVICE — COLLECTION SET, VACURETTE, BERKLEY, 6 FT

## (undated) DEVICE — DEVICE, JADA SYSTEM